# Patient Record
Sex: FEMALE | Race: WHITE | NOT HISPANIC OR LATINO
[De-identification: names, ages, dates, MRNs, and addresses within clinical notes are randomized per-mention and may not be internally consistent; named-entity substitution may affect disease eponyms.]

---

## 2017-05-26 ENCOUNTER — TRANSCRIPTION ENCOUNTER (OUTPATIENT)
Age: 67
End: 2017-05-26

## 2019-01-14 ENCOUNTER — APPOINTMENT (OUTPATIENT)
Dept: SURGERY | Facility: CLINIC | Age: 69
End: 2019-01-14

## 2019-03-27 ENCOUNTER — APPOINTMENT (OUTPATIENT)
Dept: ORTHOPEDIC SURGERY | Facility: CLINIC | Age: 69
End: 2019-03-27
Payer: MEDICARE

## 2019-03-27 VITALS
HEART RATE: 98 BPM | BODY MASS INDEX: 32.14 KG/M2 | DIASTOLIC BLOOD PRESSURE: 85 MMHG | SYSTOLIC BLOOD PRESSURE: 162 MMHG | WEIGHT: 200 LBS | HEIGHT: 66 IN

## 2019-03-27 PROCEDURE — 99203 OFFICE O/P NEW LOW 30 MIN: CPT

## 2019-03-27 RX ORDER — CYCLOBENZAPRINE HYDROCHLORIDE 10 MG/1
10 TABLET, FILM COATED ORAL 3 TIMES DAILY
Qty: 60 | Refills: 0 | Status: ACTIVE | COMMUNITY
Start: 2019-03-27 | End: 1900-01-01

## 2019-03-27 NOTE — DISCUSSION/SUMMARY
[de-identified] : We discussed further treatment options. Her radiculopathy is somewhat atypical. She has multilevel stenosis without concrete concordant symptoms. She appears to have some more facet a genetic symptoms. I recommended a CT scan for further evaluation. She will try a muscle accident. Follow up afterwards

## 2019-03-27 NOTE — PHYSICAL EXAM
[Ataxic] : not ataxic [de-identified] : Examination of the cervical spine reveals no midline or paraspinal tenderness to palpation. No cervical lymphadenopathy. Decreased range of motion with respect to flexion, extension, rotation, and lateral bending. Negative Spurlings. Negative Lhermitte's. Full range of motion bilateral shoulders without evidence of impingement. No instability of bilateral upper extremities.  Cranial nerves II through XII grossly intact. Intact sensation bilateral upper extremities. 5/5 deltoids biceps triceps wrist extensors wrist flexors finger flexors and hand intrinsics. 1+ biceps triceps and brachioradialis reflexes. Negative Ureña's. 2+ radial pulse. Negative Tinel's over the cubital and carpal tunnel. No skin lesions on the right and left upper extremities. [de-identified] : Cervical spine MRI reveals multilevel cervical stenosis more pronounced in the foraminal area

## 2019-03-27 NOTE — HISTORY OF PRESENT ILLNESS
[de-identified] : Ms. DANNIELLE GILMORE  is a 68 year old female who presents with 3 weeks of intense left sided neck pain without any specific cause or trauma.  She has left paracervical/trap pain that can radiate up into the left side of her face and head.  She is unable to sleep because of the pain.  Denies any UE radicular symptoms.  Normal bowel and bladder control.   Denies any recent fevers, chills, sweats, weight loss, or infection.\par

## 2019-04-01 ENCOUNTER — EMERGENCY (EMERGENCY)
Facility: HOSPITAL | Age: 69
LOS: 1 days | Discharge: HOME | End: 2019-04-01
Attending: EMERGENCY MEDICINE | Admitting: EMERGENCY MEDICINE
Payer: MEDICARE

## 2019-04-01 VITALS
HEART RATE: 99 BPM | OXYGEN SATURATION: 95 % | SYSTOLIC BLOOD PRESSURE: 198 MMHG | HEIGHT: 65 IN | RESPIRATION RATE: 18 BRPM | DIASTOLIC BLOOD PRESSURE: 89 MMHG | TEMPERATURE: 98 F | WEIGHT: 226.64 LBS

## 2019-04-01 VITALS
RESPIRATION RATE: 18 BRPM | OXYGEN SATURATION: 95 % | DIASTOLIC BLOOD PRESSURE: 92 MMHG | HEART RATE: 83 BPM | SYSTOLIC BLOOD PRESSURE: 158 MMHG | TEMPERATURE: 97 F

## 2019-04-01 DIAGNOSIS — Z87.898 PERSONAL HISTORY OF OTHER SPECIFIED CONDITIONS: Chronic | ICD-10-CM

## 2019-04-01 DIAGNOSIS — E03.9 HYPOTHYROIDISM, UNSPECIFIED: ICD-10-CM

## 2019-04-01 DIAGNOSIS — Z79.82 LONG TERM (CURRENT) USE OF ASPIRIN: ICD-10-CM

## 2019-04-01 DIAGNOSIS — Z79.899 OTHER LONG TERM (CURRENT) DRUG THERAPY: ICD-10-CM

## 2019-04-01 DIAGNOSIS — Z90.49 ACQUIRED ABSENCE OF OTHER SPECIFIED PARTS OF DIGESTIVE TRACT: Chronic | ICD-10-CM

## 2019-04-01 DIAGNOSIS — Z79.2 LONG TERM (CURRENT) USE OF ANTIBIOTICS: ICD-10-CM

## 2019-04-01 DIAGNOSIS — G89.29 OTHER CHRONIC PAIN: ICD-10-CM

## 2019-04-01 DIAGNOSIS — M54.2 CERVICALGIA: ICD-10-CM

## 2019-04-01 DIAGNOSIS — I25.10 ATHEROSCLEROTIC HEART DISEASE OF NATIVE CORONARY ARTERY WITHOUT ANGINA PECTORIS: ICD-10-CM

## 2019-04-01 DIAGNOSIS — Z98.89 OTHER SPECIFIED POSTPROCEDURAL STATES: Chronic | ICD-10-CM

## 2019-04-01 DIAGNOSIS — E78.5 HYPERLIPIDEMIA, UNSPECIFIED: ICD-10-CM

## 2019-04-01 DIAGNOSIS — I10 ESSENTIAL (PRIMARY) HYPERTENSION: ICD-10-CM

## 2019-04-01 LAB
ALBUMIN SERPL ELPH-MCNC: 4.1 G/DL — SIGNIFICANT CHANGE UP (ref 3.3–5)
ALP SERPL-CCNC: 117 U/L — SIGNIFICANT CHANGE UP (ref 40–120)
ALT FLD-CCNC: 30 U/L — SIGNIFICANT CHANGE UP (ref 10–45)
ANION GAP SERPL CALC-SCNC: 14 MMOL/L — SIGNIFICANT CHANGE UP (ref 5–17)
AST SERPL-CCNC: 38 U/L — SIGNIFICANT CHANGE UP (ref 10–40)
BILIRUB SERPL-MCNC: 0.6 MG/DL — SIGNIFICANT CHANGE UP (ref 0.2–1.2)
BUN SERPL-MCNC: 13 MG/DL — SIGNIFICANT CHANGE UP (ref 7–23)
CALCIUM SERPL-MCNC: 10 MG/DL — SIGNIFICANT CHANGE UP (ref 8.4–10.5)
CHLORIDE SERPL-SCNC: 99 MMOL/L — SIGNIFICANT CHANGE UP (ref 96–108)
CO2 SERPL-SCNC: 24 MMOL/L — SIGNIFICANT CHANGE UP (ref 22–31)
CREAT SERPL-MCNC: 0.84 MG/DL — SIGNIFICANT CHANGE UP (ref 0.5–1.3)
ERYTHROCYTE [SEDIMENTATION RATE] IN BLOOD: 47 MM/HR — HIGH
GLUCOSE SERPL-MCNC: 113 MG/DL — HIGH (ref 70–99)
HCT VFR BLD CALC: 40.1 % — SIGNIFICANT CHANGE UP (ref 34.5–45)
HGB BLD-MCNC: 13.3 G/DL — SIGNIFICANT CHANGE UP (ref 11.5–15.5)
MCHC RBC-ENTMCNC: 29.1 PG — SIGNIFICANT CHANGE UP (ref 27–34)
MCHC RBC-ENTMCNC: 33.2 GM/DL — SIGNIFICANT CHANGE UP (ref 32–36)
MCV RBC AUTO: 87.7 FL — SIGNIFICANT CHANGE UP (ref 80–100)
NRBC # BLD: 0 /100 WBCS — SIGNIFICANT CHANGE UP (ref 0–0)
PLATELET # BLD AUTO: 397 K/UL — SIGNIFICANT CHANGE UP (ref 150–400)
POTASSIUM SERPL-MCNC: 4.2 MMOL/L — SIGNIFICANT CHANGE UP (ref 3.5–5.3)
POTASSIUM SERPL-SCNC: 4.2 MMOL/L — SIGNIFICANT CHANGE UP (ref 3.5–5.3)
PROT SERPL-MCNC: 8.4 G/DL — HIGH (ref 6–8.3)
RBC # BLD: 4.57 M/UL — SIGNIFICANT CHANGE UP (ref 3.8–5.2)
RBC # FLD: 14.6 % — HIGH (ref 10.3–14.5)
SODIUM SERPL-SCNC: 137 MMOL/L — SIGNIFICANT CHANGE UP (ref 135–145)
WBC # BLD: 11.48 K/UL — HIGH (ref 3.8–10.5)
WBC # FLD AUTO: 11.48 K/UL — HIGH (ref 3.8–10.5)

## 2019-04-01 PROCEDURE — 85027 COMPLETE CBC AUTOMATED: CPT

## 2019-04-01 PROCEDURE — 72125 CT NECK SPINE W/O DYE: CPT

## 2019-04-01 PROCEDURE — 99284 EMERGENCY DEPT VISIT MOD MDM: CPT | Mod: 25

## 2019-04-01 PROCEDURE — 93010 ELECTROCARDIOGRAM REPORT: CPT

## 2019-04-01 PROCEDURE — 85652 RBC SED RATE AUTOMATED: CPT

## 2019-04-01 PROCEDURE — 93005 ELECTROCARDIOGRAM TRACING: CPT

## 2019-04-01 PROCEDURE — 70450 CT HEAD/BRAIN W/O DYE: CPT

## 2019-04-01 PROCEDURE — 72125 CT NECK SPINE W/O DYE: CPT | Mod: 26

## 2019-04-01 PROCEDURE — 36415 COLL VENOUS BLD VENIPUNCTURE: CPT

## 2019-04-01 PROCEDURE — 70450 CT HEAD/BRAIN W/O DYE: CPT | Mod: 26

## 2019-04-01 PROCEDURE — 80053 COMPREHEN METABOLIC PANEL: CPT

## 2019-04-01 RX ORDER — OXYCODONE AND ACETAMINOPHEN 5; 325 MG/1; MG/1
2 TABLET ORAL ONCE
Qty: 0 | Refills: 0 | Status: DISCONTINUED | OUTPATIENT
Start: 2019-04-01 | End: 2019-04-01

## 2019-04-01 RX ADMIN — OXYCODONE AND ACETAMINOPHEN 2 TABLET(S): 5; 325 TABLET ORAL at 15:13

## 2019-04-01 RX ADMIN — OXYCODONE AND ACETAMINOPHEN 2 TABLET(S): 5; 325 TABLET ORAL at 20:52

## 2019-04-01 RX ADMIN — OXYCODONE AND ACETAMINOPHEN 2 TABLET(S): 5; 325 TABLET ORAL at 16:13

## 2019-04-01 NOTE — ED PROVIDER NOTE - CLINICAL SUMMARY MEDICAL DECISION MAKING FREE TEXT BOX
68F with L sided neck pain more than 2 weeks followed by pain maangement for epidural injections and takes ibuprofen, presented to ER because she felt that pain was getting worse no change in pain no numbness, no weakness, no fever here. sed rate, wbc count mild elevation; Pt's CT cervical spine shows stenosis, which is known to pain management. Pt to follow up with nsg doubt epidural abscess, 68F with L sided neck pain more than 2 weeks followed by pain maangement for epidural injections and takes ibuprofen, presented to ER because she felt that pain was getting worse no change in pain no numbness, no weakness, no fever here. sed rate, wbc count mild elevation; Pt's CT cervical spine shows stenosis, which is known to pain management. Pt to follow up with nsg doubt epidural abscess, pt has no fever no new neck pain had MRI 1.5 weeks ago that did not show any evidence of abscess or spinal cord compression. Pt has nsg f/u in 4 days. will give short rx for pain medications. 68F with L sided neck pain more than 2 weeks followed by pain maangement for epidural injections and takes ibuprofen, presented to ER because she felt that pain was getting worse no change in pain no numbness, no weakness, no fever here. sed rate, wbc count mild elevation; Pt's CT cervical spine shows stenosis, which is known to pain management. Pt to follow up with nsg doubt epidural abscess, pt has no fever no new neck pain had MRI 1.5 weeks ago that did not show any evidence of abscess or spinal cord compression. Pt has nsg f/u in 4 days. will give short rx for pain medications.  Patient signed AMA did not want to wait for ct of head results. Report of having a appointment with specialist. Well appearing, NAD and vSS.

## 2019-04-01 NOTE — ED PROVIDER NOTE - OBJECTIVE STATEMENT
68F with concern for L sided neck pain affecting shoulder scapula for more than 2 weeks pt has been followed by pain management and has epidural injections, had MRI 1.5 weeks ago, followed by PMD no relief despite ibuprofen. Pt requesting something stronger than ibuprofen, no numbness weakness of arms legs no bowel bladder incontinence. Pt states she feels hot when she uses her heat pack but expressly denies fever.

## 2019-04-01 NOTE — ED ADULT TRIAGE NOTE - CHIEF COMPLAINT QUOTE
c/o subjective fever accompanied with neck pain and upper back pain x 1 month. Referred by PCP to go to the ED. Denies paresthesia to BUE or BLE. Denies bowel/bladder incontinence.

## 2019-04-01 NOTE — ED ADULT NURSE NOTE - OBJECTIVE STATEMENT
Pt presents with c/o neck/back pain x1 month. Pt also reports subjective fevers x 1 week. Pt referred by PCP to ED for scan. Pt denies any CP, SOB, n/v/d, palpitations or dizziness. Denies any fall/injury. Denies bowel/bladder incontinence. Denies numbness/tingling. Pt states she has tried tylenol/motrin without relief.

## 2019-04-01 NOTE — ED PROVIDER NOTE - PHYSICAL EXAMINATION
gen: no acute distress, comfortable, conversant  HEENT: oropharynx clear  Neck: r paraspinal cervical ttp r scapular ttp   CV: rrr no m/r/g 2+ radial pulse  Resp: ctab, no w/c/r  Abd: nontender, no rebound/guarding  Ext: no edema, pedal pulses 2+  Neuro: alert and oriented, cn grossly intact, strength equal in all 4 ext, sensation intact to light touch f/a/l, nl coordination, gait steady    strength 5/5 in bl ue, sensation intact to light touch radial/ulnar/median nerve sensation intact, reflexes biceps 1+ intact  symmetric

## 2019-04-01 NOTE — ED ADULT NURSE NOTE - PMH
CAD (coronary artery disease)    HLD (hyperlipidemia)    HTN (hypertension)    Hypothyroid    Malignant neoplasm of colon, unspecified part of colon

## 2019-04-01 NOTE — ED PROVIDER NOTE - CARE PROVIDERS DIRECT ADDRESSES
[email or web address]

## 2019-04-01 NOTE — ED PROVIDER NOTE - CARE PLAN
Principal Discharge DX:	Neck pain Principal Discharge DX:	Neck pain  Secondary Diagnosis:	Chronic pain

## 2019-04-01 NOTE — ED PROVIDER NOTE - CARE PROVIDER_API CALL
Wyatt Weems)  Neurosurgery  [address] [place] [address]  Phone: [phone]  Fax: [phone]  Follow Up Time:     Cuco Pope)  Orthopedics  [address] [place] [address]  Phone: [phone]  Fax: [phone]  Follow Up Time:

## 2019-04-01 NOTE — ED PROVIDER NOTE - PROGRESS NOTE DETAILS
Discussed with dr tom martinez [phone] who ordered XR, not MRI. pain management ordered MRI Spoke to with Dr. Des Collins who ordered cervical spine MRI. Pt has 3 disc herniation at C2/3, C3/4, C4/5, pt has foraminal stenosis with impingement on nerve root at C4. Stenosis C5/6, C6/7. Arthritis/spondylosis. Pt had cervical epidural injection on March 22nd. Plan to referral for NSG. Spoke to with Dr. Des Collins who ordered cervical spine MRI. Pt has 3 disc herniation at C2/3, C3/4, C4/5, pt has foraminal stenosis with impingement on nerve root at C4. Stenosis C5/6, C6/7. no e/o cord compression or other abscess. Arthritis/spondylosis. Pt had cervical epidural injection on March 22nd. Plan to referral for NSG. appreciate dc coordinator and nsg involvement, pt with sarah girard/sharla on friday appreciate dc coordinator and nsg involvement, pt with sarah ricketts f/sharla on friday pt is ambulatory feels better percocet sent to pharmacy pt endorses that pain is same pain she always has discussed with radiology resident at [facility] [place] on outside mri from 1.5 weeks ago no e/o cervical spine abscess or cord compression, pt awaiting ct head results and can be discharged home

## 2019-04-02 ENCOUNTER — RECORD ABSTRACTING (OUTPATIENT)
Age: 69
End: 2019-04-02

## 2019-04-05 ENCOUNTER — APPOINTMENT (OUTPATIENT)
Dept: NEUROSURGERY | Facility: CLINIC | Age: 69
End: 2019-04-05
Payer: MEDICARE

## 2019-04-05 ENCOUNTER — OUTPATIENT (OUTPATIENT)
Dept: OUTPATIENT SERVICES | Facility: HOSPITAL | Age: 69
LOS: 1 days | End: 2019-04-05
Payer: MEDICARE

## 2019-04-05 VITALS
DIASTOLIC BLOOD PRESSURE: 78 MMHG | SYSTOLIC BLOOD PRESSURE: 141 MMHG | HEIGHT: 66 IN | BODY MASS INDEX: 35.36 KG/M2 | HEART RATE: 81 BPM | TEMPERATURE: 98.8 F | OXYGEN SATURATION: 96 % | WEIGHT: 220 LBS | RESPIRATION RATE: 18 BRPM

## 2019-04-05 DIAGNOSIS — M54.2 CERVICALGIA: ICD-10-CM

## 2019-04-05 DIAGNOSIS — Z90.49 ACQUIRED ABSENCE OF OTHER SPECIFIED PARTS OF DIGESTIVE TRACT: Chronic | ICD-10-CM

## 2019-04-05 DIAGNOSIS — Z87.898 PERSONAL HISTORY OF OTHER SPECIFIED CONDITIONS: Chronic | ICD-10-CM

## 2019-04-05 DIAGNOSIS — M48.02 SPINAL STENOSIS, CERVICAL REGION: ICD-10-CM

## 2019-04-05 DIAGNOSIS — Z98.89 OTHER SPECIFIED POSTPROCEDURAL STATES: Chronic | ICD-10-CM

## 2019-04-05 PROCEDURE — 99205 OFFICE O/P NEW HI 60 MIN: CPT

## 2019-04-05 PROCEDURE — 72050 X-RAY EXAM NECK SPINE 4/5VWS: CPT

## 2019-04-05 PROCEDURE — 72050 X-RAY EXAM NECK SPINE 4/5VWS: CPT | Mod: 26

## 2019-04-06 PROBLEM — M54.2 NECK PAIN: Status: ACTIVE | Noted: 2019-04-05

## 2019-04-06 PROBLEM — M48.02 CERVICAL STENOSIS OF SPINE: Status: ACTIVE | Noted: 2019-03-27

## 2019-04-07 NOTE — HISTORY OF PRESENT ILLNESS
[de-identified] : 68 year old woman with past medical history metastatic colon cancer s/p colon resection, hepatic lobectomy, craniotomy for tumor resection, chemotherapy - now in remission, CAD s/p cardiac stents x 2, hypertension, hypothyroidism referred by ED after she presented to the ED on 4/1/19 with LEFT sided neck pain and numbness that radiates behind her LEFT ear and into hear head.\par \par She reports that she developed severe LEFT sided neck pain and numbness about one month ago. Denies precipitating injury or fall. She denies pain/numbness/tingling radiating into bilateral upper extremities. Denies bilateral leg pain. Denies gait disturbance, urinary/bowel incontinence. She has tried muscle relaxers with no relief. She states she saw pain management doctor and was given three steroid injections without relief of symptoms. \par \par She brings MRI cervical spine from 3/4/19 for review. She had CT cervical spine done in ED on 4/1/19.

## 2019-04-07 NOTE — PLAN
[FreeTextEntry1] : MRI cervical spine and CT cervical spine reviewed at length by Dr. Weems with patient. Imaging demonstrates of cervical spinal stenosis and degenerative disc disease. However, the patient's symptoms of LEFT neck pain radiating to head do not correlate with imaging findings. She denies radicular arm pain bilaterally. \par Explained in detail with patient.\par Cervical flex/ext  x-rays done today by Dr. Ray demonstrate no instability.\par No surgery recommended.\par Recommend physical therapy and consultation with pain management\par Educated regarding s/s neurological worsening including weakness, gait difficulty, incontinence severe pain, return to office or report to ED.\par \par Plan:\par \par 1. Physical therapy\par 2. Pain management - referral and contact information provided to Dr. Nelson Buitrago\par \par Patient verbalizes agreement and understanding with plan.\par

## 2019-04-07 NOTE — DATA REVIEWED
[de-identified] : \par EXAM: CT CERVICAL SPINE \par \par PROCEDURE DATE: 04/01/2019 \par \par \par \par INTERPRETATION: PROCEDURE: CT Cervical spine without contrast \par \par INDICATION: Neck pain \par \par TECHNIQUE: Multiple axial sections were obtained from the mid orbits to the \par sternoclavicular joint followed by multiplanar 3-D reformations. No contrast \par was given. \par \par COMPARISON: None \par \par FINDINGS: \par \par Patient is status post left suboccipital craniectomy with placement of \par radiopaque mesh across craniectomy site. There is fluid seen in the soft \par tissues extrinsic to the craniectomy consistent with pseudomeningocele. This \par measures roughly 4.6 cm TR x 1.9 cm AP dimension. \par \par On the sagittal reformations, there is reversal of the normal curvature of \par the cervical spine with a kyphosis maximum at the C4-C5 level. There is no \par prevertebral soft tissue swelling or splaying of the spinous processes. \par There is mild vertebral body height loss at C5 and C6. Vertebral body \par heights are otherwise maintained. On the coronal reformations, lateral \par masses align normally. C1 articulates normally with C2, and the condyles are \par normal. \par \par On axial images, no lucent fracture line is seen. \par \par Multilevel degenerative osteoarthritis is present. Findings include marginal \par osteophytes, particularly anteriorly, uncovertebral spurring, and facet \par joint space compartment narrowing with hypertrophic osteophytes and \par subchondral sclerosis. There is ankylosis of the facet joints at the C3-C4 \par level on the left. \par This results in multilevel foraminal narrowing as follows: right-sided C3-C4 \par level, right more than left at C5-C6 level, and bilaterally at C6-C7 level. \par \par There is multilevel degenerative disc disease. Findings include loss of disc \par space height and endplate sclerosis. There is diffuse disc-osteophyte \par complex at all levels of the cervical spine, and there may be more focal \par central disc protrusion at C2-C3, C4-C5, and C6-C7 levels. Cervical spinal \par MR could be obtained for further characterization on an outpatient basis. \par \par \par IMPRESSION: \par \par 1. No CT evidence of acute osseous injury. \par 2. Multilevel degenerative disc disease osteoarthritis, as above. \par 3. Kyphosis, apex at the C4-C5 level. \par 4. 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 \par \par \par \par \par \par "Thank you for the opportunity to participate in the care of this patient." \par \par COLLIN MEAD M.D., RADIOLOGY RESIDENT \par This document has been electronically signed. \par VIVIENNE ESPINOZA M.D., ATTENDING RADIOLOGIST \par This document has been electronically signed. Apr 1 2019 6:37PM \par \par \par \par EXAM: CT BRAIN \par \par PROCEDURE DATE: 04/01/2019 \par \par \par \par INTERPRETATION: PROCEDURE: CT head without intravenous contrast. \par \par INDICATION: Neck pain \par \par TECHNIQUE: Multiple axial sections were obtained at 5 mm intervals. The \par images were reviewed in brain and bone windows. Imaging is performed using \par helical low-dose technique, and sagittal and coronal reformations are \par provided. \par \par COMPARISON: CT head from 9/7/2013 \par \par FINDINGS: The patient is status post midline suboccipital craniectomy and \par cranioplasty. The subjacent resection cavity appears to have decreased in \par size to prior study. There is a persistent fluid collection overlying the \par cranioplasty in the subcutaneous tissue which has decreased in size now \par measuring 4.5 cm TR x 1.1 cm AP, previously 7.1 cm TR x 2.8 cm AP. \par \par The ventricles, cisternal spaces, and cortical sulci are appropriate for \par the patient's stated age. \par \par There is no acute intracranial hemorrhage, mass effect, midline shift or \par extra axial collections. \par \par The gray white differentiation appears preserved without evidence of an \par acute transcortical infarction. \par \par There are mild patchy areas of hypodensity within the periventricular white \par matter which may represent the sequela of small vessel ischemic disease. \par \par The bony windows demonstrates no fractures. The visualized paranasal sinuses \par and mastoid air cells are predominantly clear. The lenses are absent \par bilaterally. \par \par IMPRESSION: \par \par 1. No acute intracranial abnormality. Specifically, no acute intracranial \par hemorrhage, mass effect or recent transcortical or territorial infarction. \par \par 2. Status post midline suboccipital craniectomy and cranioplasty. Interval \par decrease in overlying subcutaneous fluid collection which now measures 4.5 \par cm in greatest dimension, previously 7.1 cm. \par \par \par \par \par \par \par \par \par \par \par "Thank you for the opportunity to participate in the care of this patient." \par \par VERNA NUGENT M.D., RADIOLOGY RESIDENT \par This document has been electronically signed. \par LIZA WATTS M.D., ATTENDING RADIOLOGIST \par This document has been electronically signed. Apr 1 2019 10:47PM  [de-identified] : MRI cervical spine 3/4/19: Impression:\par Posterior herniations are noted at C2-C3, C3-C4, C4-C5. At the C3-4 level, there is bony narrowing of the right neural foramen with encroachment upon the right C4 nerve root.\par \par At C5-C6 and C6-7, there is disc degeneration, deisccated disc herniations accompanied by the osteophytic ridging with bilateral foraminal stenosis. There is encroachment upon the nerve roots within the foramina at each of these levels.\par \par There is evidence of prior craniectomy with the presence of pseudomeningocele in the suboccipital region.

## 2019-04-07 NOTE — ADDENDUM
[FreeTextEntry1] : Ms. Valencia has left sided neck pain and associated left shoulder pain. The pain distribution is not clearly radicular. She has full neck ROM and some paraspinal muscle tenderness. Her pain is not related to motion. Cervical flexion-extension x-rays do not show evidence of instability. Her MRI c-spine shows multilevel degenerative spondylosis without significant compression corresponding to symptoms. I have recommended conservative measures including physical therapy and consultation with pain management to address her symptoms.\par \par Wyatt Weems M.D.

## 2019-04-07 NOTE — PHYSICAL EXAM
[General Appearance - Alert] : alert [General Appearance - In No Acute Distress] : in no acute distress [Oriented To Time, Place, And Person] : oriented to person, place, and time [Motor Tone] : muscle tone was normal in all four extremities [Motor Strength] : muscle strength was normal in all four extremities [Sclera] : the sclera and conjunctiva were normal [Outer Ear] : the ears and nose were normal in appearance [Neck Appearance] : the appearance of the neck was normal [] : no respiratory distress [Respiration, Rhythm And Depth] : normal respiratory rhythm and effort [Heart Rate And Rhythm] : heart rate was normal and rhythm regular [Abnormal Walk] : normal gait [Skin Color & Pigmentation] : normal skin color and pigmentation [Pain] : was painless

## 2019-04-19 ENCOUNTER — APPOINTMENT (OUTPATIENT)
Dept: ORTHOPEDIC SURGERY | Facility: CLINIC | Age: 69
End: 2019-04-19

## 2019-05-31 NOTE — HISTORY OF PRESENT ILLNESS
[de-identified] : 68 year old woman with past medical history metastatic colon cancer s/p colon resection, hepatic lobectomy, craniotomy for tumor resection, chemotherapy - now in remission, CAD s/p cardiac stents x 2, hypertension, hypothyroidism referred by ED after she presented to the ED on 4/1/19 with LEFT sided neck pain and numbness that radiates behind her LEFT ear and into hear head.\par \par She reports that she developed severe LEFT sided neck pain and numbness about one month ago. Denies precipitating injury or fall. She denies pain/numbness/tingling radiating into bilateral upper extremities. Denies bilateral leg pain. Denies gait disturbance, urinary/bowel incontinence. She has tried muscle relaxers with no relief. She states she saw pain management doctor and was given three steroid injections without relief of symptoms. \par \par She brings MRI cervical spine from 3/4/19 for review. She had CT cervical spine done in ED on 4/1/19.

## 2019-06-03 ENCOUNTER — APPOINTMENT (OUTPATIENT)
Dept: NEUROSURGERY | Facility: CLINIC | Age: 69
End: 2019-06-03

## 2019-06-22 ENCOUNTER — TRANSCRIPTION ENCOUNTER (OUTPATIENT)
Age: 69
End: 2019-06-22

## 2019-09-23 ENCOUNTER — EMERGENCY (EMERGENCY)
Facility: HOSPITAL | Age: 69
LOS: 1 days | Discharge: HOME | End: 2019-09-23
Attending: EMERGENCY MEDICINE | Admitting: EMERGENCY MEDICINE
Payer: MEDICARE

## 2019-09-23 VITALS
HEIGHT: 65 IN | OXYGEN SATURATION: 98 % | SYSTOLIC BLOOD PRESSURE: 159 MMHG | DIASTOLIC BLOOD PRESSURE: 92 MMHG | RESPIRATION RATE: 16 BRPM | TEMPERATURE: 99 F | HEART RATE: 118 BPM | WEIGHT: 179.9 LBS

## 2019-09-23 VITALS
DIASTOLIC BLOOD PRESSURE: 76 MMHG | OXYGEN SATURATION: 98 % | HEART RATE: 82 BPM | SYSTOLIC BLOOD PRESSURE: 151 MMHG | RESPIRATION RATE: 16 BRPM | TEMPERATURE: 98 F

## 2019-09-23 DIAGNOSIS — Z98.89 OTHER SPECIFIED POSTPROCEDURAL STATES: Chronic | ICD-10-CM

## 2019-09-23 DIAGNOSIS — Z87.898 PERSONAL HISTORY OF OTHER SPECIFIED CONDITIONS: Chronic | ICD-10-CM

## 2019-09-23 DIAGNOSIS — Z90.49 ACQUIRED ABSENCE OF OTHER SPECIFIED PARTS OF DIGESTIVE TRACT: Chronic | ICD-10-CM

## 2019-09-23 LAB
APPEARANCE UR: ABNORMAL
APTT BLD: 35.8 SEC — SIGNIFICANT CHANGE UP (ref 27.5–36.3)
BACTERIA # UR AUTO: PRESENT /HPF
BASOPHILS # BLD AUTO: 0.04 K/UL — SIGNIFICANT CHANGE UP (ref 0–0.2)
BASOPHILS NFR BLD AUTO: 0.9 % — SIGNIFICANT CHANGE UP (ref 0–2)
BILIRUB UR-MCNC: ABNORMAL
BLD GP AB SCN SERPL QL: NEGATIVE — SIGNIFICANT CHANGE UP
COLOR SPEC: YELLOW — SIGNIFICANT CHANGE UP
COMMENT - URINE: SIGNIFICANT CHANGE UP
DACRYOCYTES BLD QL SMEAR: SLIGHT — SIGNIFICANT CHANGE UP
DIFF PNL FLD: ABNORMAL
ELLIPTOCYTES BLD QL SMEAR: SLIGHT — SIGNIFICANT CHANGE UP
EOSINOPHIL # BLD AUTO: 0.12 K/UL — SIGNIFICANT CHANGE UP (ref 0–0.5)
EOSINOPHIL NFR BLD AUTO: 2.6 % — SIGNIFICANT CHANGE UP (ref 0–6)
EPI CELLS # UR: ABNORMAL /HPF (ref 0–5)
GIANT PLATELETS BLD QL SMEAR: PRESENT — SIGNIFICANT CHANGE UP
GLUCOSE UR QL: NEGATIVE — SIGNIFICANT CHANGE UP
GRAN CASTS # UR COMP ASSIST: ABNORMAL /LPF
HCT VFR BLD CALC: 31.6 % — LOW (ref 34.5–45)
HGB BLD-MCNC: 10.2 G/DL — LOW (ref 11.5–15.5)
HYALINE CASTS # UR AUTO: SIGNIFICANT CHANGE UP /LPF (ref 0–2)
HYPOCHROMIA BLD QL: SLIGHT — SIGNIFICANT CHANGE UP
INR BLD: 1.19 — HIGH (ref 0.88–1.16)
KETONES UR-MCNC: 15 MG/DL
LEUKOCYTE ESTERASE UR-ACNC: ABNORMAL
LIDOCAIN IGE QN: 20 U/L — SIGNIFICANT CHANGE UP (ref 7–60)
LYMPHOCYTES # BLD AUTO: 0.45 K/UL — LOW (ref 1–3.3)
LYMPHOCYTES # BLD AUTO: 9.7 % — LOW (ref 13–44)
MACROCYTES BLD QL: SLIGHT — SIGNIFICANT CHANGE UP
MANUAL SMEAR VERIFICATION: SIGNIFICANT CHANGE UP
MCHC RBC-ENTMCNC: 31.5 PG — SIGNIFICANT CHANGE UP (ref 27–34)
MCHC RBC-ENTMCNC: 32.3 GM/DL — SIGNIFICANT CHANGE UP (ref 32–36)
MCV RBC AUTO: 97.5 FL — SIGNIFICANT CHANGE UP (ref 80–100)
MONOCYTES # BLD AUTO: 0.37 K/UL — SIGNIFICANT CHANGE UP (ref 0–0.9)
MONOCYTES NFR BLD AUTO: 8 % — SIGNIFICANT CHANGE UP (ref 2–14)
NEUTROPHILS # BLD AUTO: 3.66 K/UL — SIGNIFICANT CHANGE UP (ref 1.8–7.4)
NEUTROPHILS NFR BLD AUTO: 72.6 % — SIGNIFICANT CHANGE UP (ref 43–77)
NEUTS BAND # BLD: 6.2 % — SIGNIFICANT CHANGE UP (ref 0–8)
NITRITE UR-MCNC: NEGATIVE — SIGNIFICANT CHANGE UP
OVALOCYTES BLD QL SMEAR: SLIGHT — SIGNIFICANT CHANGE UP
PH UR: 6.5 — SIGNIFICANT CHANGE UP (ref 5–8)
PLAT MORPH BLD: ABNORMAL
PLATELET # BLD AUTO: 286 K/UL — SIGNIFICANT CHANGE UP (ref 150–400)
PROT UR-MCNC: 100 MG/DL
PROTHROM AB SERPL-ACNC: 13.5 SEC — HIGH (ref 10–12.9)
RBC # BLD: 3.24 M/UL — LOW (ref 3.8–5.2)
RBC # FLD: 16.8 % — HIGH (ref 10.3–14.5)
RBC BLD AUTO: ABNORMAL
RBC CASTS # UR COMP ASSIST: < 5 /HPF — SIGNIFICANT CHANGE UP
RH IG SCN BLD-IMP: POSITIVE — SIGNIFICANT CHANGE UP
SP GR SPEC: 1.02 — SIGNIFICANT CHANGE UP (ref 1–1.03)
STOMATOCYTES BLD QL SMEAR: SLIGHT — SIGNIFICANT CHANGE UP
UROBILINOGEN FLD QL: 1 E.U./DL — SIGNIFICANT CHANGE UP
WBC # BLD: 4.65 K/UL — SIGNIFICANT CHANGE UP (ref 3.8–10.5)
WBC # FLD AUTO: 4.65 K/UL — SIGNIFICANT CHANGE UP (ref 3.8–10.5)
WBC UR QL: ABNORMAL /HPF

## 2019-09-23 PROCEDURE — 83690 ASSAY OF LIPASE: CPT

## 2019-09-23 PROCEDURE — 85610 PROTHROMBIN TIME: CPT

## 2019-09-23 PROCEDURE — 74177 CT ABD & PELVIS W/CONTRAST: CPT

## 2019-09-23 PROCEDURE — 99284 EMERGENCY DEPT VISIT MOD MDM: CPT | Mod: 25

## 2019-09-23 PROCEDURE — 85025 COMPLETE CBC W/AUTO DIFF WBC: CPT

## 2019-09-23 PROCEDURE — 74177 CT ABD & PELVIS W/CONTRAST: CPT | Mod: 26

## 2019-09-23 PROCEDURE — 86901 BLOOD TYPING SEROLOGIC RH(D): CPT

## 2019-09-23 PROCEDURE — 71260 CT THORAX DX C+: CPT | Mod: 26

## 2019-09-23 PROCEDURE — 80053 COMPREHEN METABOLIC PANEL: CPT

## 2019-09-23 PROCEDURE — 71260 CT THORAX DX C+: CPT

## 2019-09-23 PROCEDURE — 85730 THROMBOPLASTIN TIME PARTIAL: CPT

## 2019-09-23 PROCEDURE — 81001 URINALYSIS AUTO W/SCOPE: CPT

## 2019-09-23 PROCEDURE — 99284 EMERGENCY DEPT VISIT MOD MDM: CPT

## 2019-09-23 PROCEDURE — 86900 BLOOD TYPING SEROLOGIC ABO: CPT

## 2019-09-23 PROCEDURE — 36415 COLL VENOUS BLD VENIPUNCTURE: CPT

## 2019-09-23 PROCEDURE — 86850 RBC ANTIBODY SCREEN: CPT

## 2019-09-23 RX ORDER — IOHEXOL 300 MG/ML
30 INJECTION, SOLUTION INTRAVENOUS ONCE
Refills: 0 | Status: COMPLETED | OUTPATIENT
Start: 2019-09-23 | End: 2019-09-23

## 2019-09-23 RX ORDER — SODIUM CHLORIDE 9 MG/ML
500 INJECTION INTRAMUSCULAR; INTRAVENOUS; SUBCUTANEOUS ONCE
Refills: 0 | Status: COMPLETED | OUTPATIENT
Start: 2019-09-23 | End: 2019-09-23

## 2019-09-23 RX ADMIN — IOHEXOL 30 MILLILITER(S): 300 INJECTION, SOLUTION INTRAVENOUS at 17:03

## 2019-09-23 RX ADMIN — SODIUM CHLORIDE 500 MILLILITER(S): 9 INJECTION INTRAMUSCULAR; INTRAVENOUS; SUBCUTANEOUS at 18:01

## 2019-09-23 NOTE — ED PROVIDER NOTE - PROGRESS NOTE DETAILS
Pt requesting discharge, when asked why she states "because I never saw my surgeon!" Again explained that the surgeon she was sent to the ED would not likely be in the ER today, as confirmed by surgery resident previously. Advised we did not yet have the results of her scans available at this time, and that I could not predict what would be recommended yet by surgery team. Pt States she came here for a surgery the same day and is very upset that did not happen. Reminded again that from our conversation when I first interviewed her that this was unlikely to occur today and that surgery would need to make a decision. Pt refused to have meaningful conversation regarding her risks/benefits, states "he's not the only surgeon in town, I can go elsewhere. I demand respect." and walked out.

## 2019-09-23 NOTE — ED ADULT NURSE REASSESSMENT NOTE - NS ED NURSE REASSESS COMMENT FT1
Rec'd pt agitated, reporting that she is not pleased with her wait time. Reassurance offered and pt made aware of plan of care. Pt in stable condition, breathing with ease on  room air. Pt states "I am not feeling well but I am fine". Pt did not wish to disclose any more information. Safety precautions in place. Monitoring contiunes.

## 2019-09-23 NOTE — CONSULT NOTE ADULT - ASSESSMENT
62 yo F w/ hx HTN, HLD, hypothyroidism, brachiocephalic thrombosis (2012, tx w/ lovenox), CAD s/p PCI (2012) and stent (2016), colon cancer s/p chemoradiation, LAR (2012) and hepatic segment 8 segmentectomy/cholecystectomy (2012), benign meningioma s/p craniotomy and resection (2013), now on maintenance chemo for metastatic disease (last dose 2 weeks ago), with metastatic colon cancer. Patient has stable metastatic disease to liver and lungs with worsening pelvic disease,     - recommended admission to Dr. John with plan for surgery this week on worsening pelvic disease, however patient eloped from ED before could admit as was upset she wasn't having surgery immediately  - if returns to ED tonight/tomorrow recommend clear liquid diet, consult Dr. Parker in AM for pulmonary eval and pre-operative workup, consult gyn/onc (Dr. John to name attending)

## 2019-09-23 NOTE — ED ADULT NURSE NOTE - NSFALLRSKPASTHIST_ED_ALL_ED
Received call from Camille Barnett [phone]      Mesha Collazo    [facility]     no

## 2019-09-23 NOTE — CONSULT NOTE ADULT - SUBJECTIVE AND OBJECTIVE BOX
64 yo F w/ hx HTN, HLD, hypothyroidism, brachiocephalic thrombosis (2012, tx w/ lovenox), CAD s/p PCI (2012) and stent (2016), colon cancer s/p chemoradiation, LAR (2012) and hepatic segment 8 segmentectomy/cholecystectomy (2012), benign meningioma s/p craniotomy and resection (2013), now on maintenance chemo for metastatic disease (last dose 2 weeks ago), presenting with chronic lower abdominal pain. Endorses nausea but denies emesis. Last BM yest, non-bloody. Denies hematuria, dysuria, CP, SOB.     Meds: levothyroxine 175 qD, amlodipine 5 mg qD, folic acid 1 mg qD    Vital Signs Last 24 Hrs  T(C): 36.8 (23 Sep 2019 19:09), Max: 37 (23 Sep 2019 14:54)  T(F): 98.2 (23 Sep 2019 19:09), Max: 98.6 (23 Sep 2019 14:54)  HR: 82 (23 Sep 2019 19:09) (82 - 118)  BP: 151/76 (23 Sep 2019 19:09) (151/76 - 159/92)  BP(mean): --  RR: 16 (23 Sep 2019 19:09) (16 - 16)  SpO2: 98% (23 Sep 2019 19:09) (98% - 98%)    General: NAD  Pulm: normal resp effort and excursion  Abd: soft, minimal distension, lower abdominal TTP, no guarding or rebound                          10.2   4.65  )-----------( 286      ( 23 Sep 2019 16:29 )             31.6   09-23    141  |  103  |  15  ----------------------------<  127<H>  3.4<L>   |  23  |  0.85    Ca    10.0      23 Sep 2019 16:29    TPro  7.9  /  Alb  4.0  /  TBili  1.0  /  DBili  x   /  AST  22  /  ALT  11  /  AlkPhos  113  09-23    CT  IMPRESSION:   Questionable filling defect within left common femoral vein suspicious   for thrombosis. Recommend correlation with lower extremity Doppler.    Numerous pulmonary lesions throughout the right and left lung fields   consistent with pulmonary metastatic disease.    New low attenuating lesion within the medial left hepatic lobe measuring   up to 6.9 cm. This demonstrates a density of 36 Hounsfield units. In a   patient with colonic neoplasm cannot exclude hepatic metastatic disease.   Recommend further correlation with biopsy or PET/CT.    Severe right renal hydronephrosis likely secondary to extrinsic   compression on the distal right ureter from masslike lesion within the   pelvis within the region of the right aspect of the uterus/adnexa. This   is new from the prior study of 3/28/2015.      New 10 mm circumscribed sclerotic lesion involving the L1 vertebral body   which is new from the prior study along with sclerotic changes involving   the right ischium. Findings are concerning for osseous metastatic   disease. Consider further correlation with nuclear medicine bone scan.

## 2019-09-23 NOTE — ED PROVIDER NOTE - OBJECTIVE STATEMENT
69F pmh CAD, HLD, HTN, hypothyroid, colon cancer p/w abd pain.    PMD Dr. Martin 69F pmh CAD, HLD, HTN, hypothyroid, colon cancer p/w abd pain, suprapubic, min change from prior. No n/v, no vomiting, no diarrhea, no f/c, no cp, no sob. Pain is moderate after PO intake.     PMD Dr. Martin

## 2019-09-23 NOTE — ED ADULT NURSE NOTE - CHPI ED NUR SYMPTOMS NEG
no diarrhea/no vomiting/no hematuria/no dysuria/no fever/no blood in stool/no burning urination/no chills

## 2019-09-23 NOTE — ED PROVIDER NOTE - CLINICAL SUMMARY MEDICAL DECISION MAKING FREE TEXT BOX
69F pmh CAD, HLD, HTN, hypothyroid, colon cancer p/w abd pain, suprapubic, min change from prior. No n/v, no vomiting, no diarrhea, no f/c, no cp, no sob. Pain is moderate after PO intake. Exam noted for tachycardia, suprapubic distention. Concern cancer, mass, dehydration, electrolyte abnormality, consider obstructive process. Gen surg consulted, final recs pending. 69F pmh CAD, HLD, HTN, hypothyroid, colon cancer p/w abd pain, suprapubic, min change from prior. No n/v, no vomiting, no diarrhea, no f/c, no cp, no sob. Pain is moderate after PO intake. Exam noted for tachycardia, suprapubic distention. Concern cancer, mass, dehydration, electrolyte abnormality, consider obstructive process. Gen surg consulted, final recs pending. CT obtained, however pt upset she did not get a same-day surgery for her cancer. Pt eloped without meaningful discussion, refused to consider AMA discussion.

## 2019-09-23 NOTE — ED PROVIDER NOTE - PHYSICAL EXAMINATION
VITAL SIGNS: I have reviewed nursing notes and confirm.  CONSTITUTIONAL: Well-developed; well-nourished; in min distress.  SKIN: Skin is warm and dry, no acute rash.  HEAD: Normocephalic; atraumatic.  EYES:  EOM intact; conjunctiva and sclera clear.  ENT: No nasal discharge; airway clear.  NECK: Supple; Voluntary FROM  CARD: No rubs appreciated, tachycardic rate and rhythm.  RESP: No wheezes, no rales. No respiratory distress  ABD: Soft; non-distended; non-tender; no rebound or guarding, mild suprapubic distention  EXT: Normal ROM. No cyanosis or edema.  NEURO: Alert, oriented. Grossly unremarkable.  PSYCH: Cooperative, appropriate.

## 2019-09-23 NOTE — ED ADULT TRIAGE NOTE - CHIEF COMPLAINT QUOTE
"my doctor Dr. Martin called me and told me to come because he found a surgeon who takes my insurance to do surgery for my stage 4 CA" pt does not know what kind of surgery, endorses abd pain for weeks; EKG in progress

## 2019-09-23 NOTE — ED ADULT NURSE REASSESSMENT NOTE - NS ED NURSE REASSESS COMMENT FT1
Pt reports frustration and stated that she was leaving because she has been here a while and does not wish to wait any longer. Pt made aware of the plan of care and RN attempted to assist and alleviate patient's concerns. Pt is not re-directable. Pt has family member with her who states that she is well enough to leave and follow up out patient. MD Breed made aware and will assess pt. Pt was made aware that the MD will assess her. Pt agreed to wait, only momentarily for MD "otherwise I am out of here.". Pt changed back into her personal clothes, sitting in chair. Will follow up. HL was removed as per pt's request.

## 2019-10-02 DIAGNOSIS — R14.0 ABDOMINAL DISTENSION (GASEOUS): ICD-10-CM

## 2019-10-02 DIAGNOSIS — R10.30 LOWER ABDOMINAL PAIN, UNSPECIFIED: ICD-10-CM

## 2020-03-19 ENCOUNTER — OTHER- (OUTPATIENT)
Dept: RURAL CLINIC 1 | Facility: CLINIC | Age: 70
Setting detail: DERMATOLOGY
End: 2020-03-19

## 2020-03-19 DIAGNOSIS — C44.319 BASAL CELL CARCINOMA OF SKIN OF OTHER PARTS OF FACE: ICD-10-CM

## 2020-03-19 PROCEDURE — 99202 OFFICE O/P NEW SF 15 MIN: CPT

## 2020-03-19 RX ORDER — TRIAMCINOLONE ACETONIDE 1 MG/G
1 APPLICATION OINTMENT TOPICAL BID
Qty: 15 | Refills: 3
Start: 2020-03-19

## 2020-09-03 NOTE — ED PROVIDER NOTE - PMH
0 = swallows foods/liquids without difficulty
CAD (coronary artery disease)    HLD (hyperlipidemia)    HTN (hypertension)    Hypothyroid    Malignant neoplasm of colon, unspecified part of colon

## 2020-10-06 NOTE — ED ADULT NURSE NOTE - CARDIO ASSESSMENT
Spoke with patient and told her that we will call her as soon as we get her lab work back.      ----- Message from Jeniffer Wylie sent at 10/6/2020  3:49 PM CDT -----  Regarding: Patient Call Back  Who Called:KEVAN LOVETT [[id number]]    What is the request in detail:Call back in regards to lab orders and results     Can the clinic reply by  [facility]?No    Best Call Back Number:[phone]         ---

## 2021-02-10 NOTE — ED ADULT NURSE NOTE - NS ED NURSE LEVEL OF CONSCIOUSNESS SPEECH
ESTABLISHED PATIENT PRE-VISIT PLANNING     Patient was NOT contacted to complete PVP.     Note: Patient will not be contacted if there is no indication to call.     1.  Reviewed notes from the last few office visits within the medical group: Yes    2.  If any orders were placed at last visit or intended to be done for this visit (i.e. 6 mos follow-up), do we have Results/Consult Notes?         •  Labs - Labs ordered, completed on 02/03/2021 and results are in chart.  Note: If patient appointment is for lab review and patient did not complete labs, check with provider if OK to reschedule patient until labs completed.       •  Imaging - Imaging ordered, completed and results are in chart.       •  Referrals - Referral ordered, patient has NOT been seen.    3. Is this appointment scheduled as a Hospital Follow-Up? No    4.  Immunizations were updated in Epic using Reconcile Outside Information activity? Yes    5.  Patient is due for the following Health Maintenance Topics:   Health Maintenance Due   Topic Date Due   • IMM ZOSTER VACCINES (1 of 2) 03/14/2007   • IMM INFLUENZA (1) 09/01/2020       6.  AHA (Pulse8) form printed for Provider? N/A     Speaking Coherently

## 2022-11-09 RX ORDER — HYDROMORPHONE HYDROCHLORIDE 2 MG/ML
0.5 INJECTION, SOLUTION INTRAMUSCULAR; INTRAVENOUS; SUBCUTANEOUS EVERY 5 MIN PRN
Status: CANCELLED | OUTPATIENT
Start: 2022-11-09

## 2022-11-09 RX ORDER — SODIUM CHLORIDE 0.9 % (FLUSH) 0.9 %
5-40 SYRINGE (ML) INJECTION PRN
Status: CANCELLED | OUTPATIENT
Start: 2022-11-09

## 2022-11-09 RX ORDER — LABETALOL HYDROCHLORIDE 5 MG/ML
10 INJECTION, SOLUTION INTRAVENOUS
Status: CANCELLED | OUTPATIENT
Start: 2022-11-09

## 2022-11-09 RX ORDER — PROCHLORPERAZINE EDISYLATE 5 MG/ML
5 INJECTION INTRAMUSCULAR; INTRAVENOUS
Status: CANCELLED | OUTPATIENT
Start: 2022-11-09 | End: 2022-11-10

## 2022-11-09 RX ORDER — HYDRALAZINE HYDROCHLORIDE 20 MG/ML
10 INJECTION INTRAMUSCULAR; INTRAVENOUS
Status: CANCELLED | OUTPATIENT
Start: 2022-11-09

## 2022-11-09 RX ORDER — HYDROMORPHONE HYDROCHLORIDE 2 MG/ML
0.25 INJECTION, SOLUTION INTRAMUSCULAR; INTRAVENOUS; SUBCUTANEOUS EVERY 5 MIN PRN
Status: CANCELLED | OUTPATIENT
Start: 2022-11-09

## 2022-11-09 RX ORDER — DIPHENHYDRAMINE HYDROCHLORIDE 50 MG/ML
12.5 INJECTION INTRAMUSCULAR; INTRAVENOUS
Status: CANCELLED | OUTPATIENT
Start: 2022-11-09 | End: 2022-11-10

## 2022-11-09 RX ORDER — HALOPERIDOL 5 MG/ML
1 INJECTION INTRAMUSCULAR
Status: CANCELLED | OUTPATIENT
Start: 2022-11-09 | End: 2022-11-10

## 2022-11-09 RX ORDER — SODIUM CHLORIDE 9 MG/ML
INJECTION, SOLUTION INTRAVENOUS PRN
Status: CANCELLED | OUTPATIENT
Start: 2022-11-09

## 2022-11-09 RX ORDER — OXYCODONE HYDROCHLORIDE 5 MG/1
5 TABLET ORAL
Status: CANCELLED | OUTPATIENT
Start: 2022-11-09 | End: 2022-11-10

## 2022-11-09 RX ORDER — SODIUM CHLORIDE 0.9 % (FLUSH) 0.9 %
5-40 SYRINGE (ML) INJECTION EVERY 12 HOURS SCHEDULED
Status: CANCELLED | OUTPATIENT
Start: 2022-11-09

## 2022-11-10 ENCOUNTER — APPOINTMENT (OUTPATIENT)
Dept: GENERAL RADIOLOGY | Age: 72
End: 2022-11-10
Attending: PODIATRIST
Payer: OTHER GOVERNMENT

## 2022-11-10 ENCOUNTER — HOSPITAL ENCOUNTER (OUTPATIENT)
Age: 72
Discharge: HOME | End: 2022-11-10
Attending: PODIATRIST | Admitting: PODIATRIST
Payer: OTHER GOVERNMENT

## 2022-11-10 PROBLEM — M20.11 HALLUX VALGUS (ACQUIRED), RIGHT FOOT: Status: ACTIVE | Noted: 2022-11-10

## 2022-11-10 LAB
GLUCOSE BLD STRIP.AUTO-MCNC: 90 MG/DL (ref 65–100)
POTASSIUM BLD-SCNC: 4.5 MMOL/L (ref 3.5–5.1)
SERVICE CMNT-IMP: NORMAL

## 2022-11-10 PROCEDURE — 84132 ASSAY OF SERUM POTASSIUM: CPT

## 2022-11-10 PROCEDURE — 82962 GLUCOSE BLOOD TEST: CPT

## 2022-11-10 RX ORDER — SODIUM CHLORIDE 0.9 % (FLUSH) 0.9 %
5-40 SYRINGE (ML) INJECTION PRN
Status: DISCONTINUED | OUTPATIENT
Start: 2022-11-10 | End: 2022-11-10 | Stop reason: HOSPADM

## 2022-11-10 RX ORDER — SODIUM CHLORIDE 9 MG/ML
INJECTION, SOLUTION INTRAVENOUS PRN
Status: DISCONTINUED | OUTPATIENT
Start: 2022-11-10 | End: 2022-11-10 | Stop reason: SDUPTHER

## 2022-11-10 RX ORDER — SODIUM CHLORIDE 9 MG/ML
INJECTION, SOLUTION INTRAVENOUS PRN
Status: DISCONTINUED | OUTPATIENT
Start: 2022-11-10 | End: 2022-11-10 | Stop reason: HOSPADM

## 2022-11-10 RX ORDER — SODIUM CHLORIDE 0.9 % (FLUSH) 0.9 %
5-40 SYRINGE (ML) INJECTION EVERY 12 HOURS SCHEDULED
Status: DISCONTINUED | OUTPATIENT
Start: 2022-11-10 | End: 2022-11-10 | Stop reason: HOSPADM

## 2022-11-10 RX ORDER — SODIUM CHLORIDE, SODIUM LACTATE, POTASSIUM CHLORIDE, CALCIUM CHLORIDE 600; 310; 30; 20 MG/100ML; MG/100ML; MG/100ML; MG/100ML
INJECTION, SOLUTION INTRAVENOUS CONTINUOUS
Status: DISCONTINUED | OUTPATIENT
Start: 2022-11-10 | End: 2022-11-10 | Stop reason: HOSPADM

## 2022-11-10 RX ORDER — LIDOCAINE HYDROCHLORIDE 10 MG/ML
1 INJECTION, SOLUTION INFILTRATION; PERINEURAL
Status: DISCONTINUED | OUTPATIENT
Start: 2022-11-10 | End: 2022-11-10 | Stop reason: HOSPADM

## 2022-11-10 NOTE — PERIOP NOTE
On arrival patients BP was 240/110. Manual BP checked was 260/100, patient denies any chest pain or shortness of breath. Patient is alert with spouse at bedside. MDA notified. MDA spoke with patient and advised patient that surgery needs to be rescheduled. Advised patient to seek immediate medical attention is she starts to experience any signs or symptoms of a heart attack or stroke. Patient also advised to inform PCP of abnormal BP.

## 2023-05-24 RX ORDER — METOPROLOL SUCCINATE 25 MG/1
1 TABLET, EXTENDED RELEASE ORAL
COMMUNITY
Start: 2022-11-02

## 2023-05-24 RX ORDER — ALBUTEROL SULFATE 90 UG/1
2 AEROSOL, METERED RESPIRATORY (INHALATION) EVERY 4 HOURS PRN
COMMUNITY
Start: 2013-01-21

## 2023-05-24 RX ORDER — FLUTICASONE PROPIONATE 50 MCG
2 SPRAY, SUSPENSION (ML) NASAL DAILY
COMMUNITY
Start: 2021-07-26

## 2023-05-24 RX ORDER — OXYBUTYNIN CHLORIDE 5 MG/1
5 TABLET ORAL PRN
COMMUNITY
Start: 2020-12-22

## 2023-05-24 RX ORDER — AMLODIPINE BESYLATE 10 MG/1
10 TABLET ORAL DAILY
COMMUNITY
Start: 2021-12-21

## 2023-05-24 RX ORDER — MELOXICAM 15 MG/1
1 TABLET ORAL
COMMUNITY
Start: 2021-09-23

## 2023-05-24 RX ORDER — GABAPENTIN 400 MG/1
400 CAPSULE ORAL 3 TIMES DAILY
COMMUNITY
Start: 2014-12-26

## 2023-05-24 RX ORDER — ACETAMINOPHEN 500 MG
500 TABLET ORAL PRN
COMMUNITY
Start: 2013-01-21

## 2023-05-24 RX ORDER — ASPIRIN 81 MG/81MG
1 CAPSULE ORAL
COMMUNITY

## 2023-05-24 RX ORDER — ATORVASTATIN CALCIUM 80 MG/1
80 TABLET, FILM COATED ORAL DAILY
COMMUNITY
Start: 2013-04-30

## 2023-05-24 RX ORDER — HYDROCHLOROTHIAZIDE 25 MG/1
25 TABLET ORAL DAILY
COMMUNITY
Start: 2014-12-26

## 2023-05-24 RX ORDER — FERROUS SULFATE 324(65)MG
324 TABLET, DELAYED RELEASE (ENTERIC COATED) ORAL
COMMUNITY
Start: 2022-03-18

## 2023-05-24 RX ORDER — LEVOCETIRIZINE DIHYDROCHLORIDE 5 MG/1
TABLET, FILM COATED ORAL
COMMUNITY
Start: 2021-07-26

## 2023-05-24 RX ORDER — FLUTICASONE FUROATE AND VILANTEROL 100; 25 UG/1; UG/1
1 POWDER RESPIRATORY (INHALATION)
COMMUNITY
Start: 2021-11-15

## 2023-05-24 RX ORDER — LISINOPRIL 20 MG/1
20 TABLET ORAL DAILY
COMMUNITY

## 2023-06-08 ENCOUNTER — ANESTHESIA EVENT (OUTPATIENT)
Dept: SURGERY | Age: 73
End: 2023-06-08
Payer: OTHER GOVERNMENT

## 2023-06-08 NOTE — PROGRESS NOTES
Preop department called to notify patient of arrival time for scheduled procedure. Instructions given to   - Arrive at [address] - Remain NPO after midnight, unless otherwise indicated, including gum, mints, and ice chips. - Have a responsible adult to drive patient to the hospital, stay during surgery, and patient will need supervision 24 hours after anesthesia. - Use antibacterial soap in shower the night before surgery and on the morning of surgery.        Was patient contacted: voicemail  Voicemail left: yes  Numbers contacted: [phone]   Arrival time: 0700

## 2023-06-08 NOTE — PERIOP NOTE
Preop department called to notify patient of arrival time for scheduled procedure. Instructions given to   - Arrive at [address] - Remain NPO after midnight, unless otherwise indicated, including gum, mints, and ice chips. - Have a responsible adult to drive patient to the hospital, stay during surgery, and patient will need supervision 24 hours after anesthesia. - Use antibacterial soap in shower the night before surgery and on the morning of surgery.        Was patient contacted: pt  Voicemail left:   Numbers contacted: [phone]   Arrival time: 0700

## 2023-06-09 ENCOUNTER — ANESTHESIA (OUTPATIENT)
Dept: SURGERY | Age: 73
End: 2023-06-09
Payer: OTHER GOVERNMENT

## 2023-06-09 ENCOUNTER — APPOINTMENT (OUTPATIENT)
Dept: GENERAL RADIOLOGY | Age: 73
End: 2023-06-09
Attending: PODIATRIST
Payer: OTHER GOVERNMENT

## 2023-06-09 ENCOUNTER — HOSPITAL ENCOUNTER (OUTPATIENT)
Age: 73
Discharge: HOME | End: 2023-06-09
Attending: PODIATRIST | Admitting: PODIATRIST
Payer: OTHER GOVERNMENT

## 2023-06-09 VITALS
DIASTOLIC BLOOD PRESSURE: 78 MMHG | BODY MASS INDEX: 26.7 KG/M2 | OXYGEN SATURATION: 95 % | RESPIRATION RATE: 16 BRPM | SYSTOLIC BLOOD PRESSURE: 171 MMHG | HEIGHT: 60 IN | TEMPERATURE: 97.6 F | WEIGHT: 136 LBS | HEART RATE: 64 BPM

## 2023-06-09 PROBLEM — M20.11 HALLUX ABDUCTOVALGUS WITH BUNIONS, RIGHT: Status: ACTIVE | Noted: 2023-06-09

## 2023-06-09 PROBLEM — M21.611 HALLUX ABDUCTOVALGUS WITH BUNIONS, RIGHT: Status: ACTIVE | Noted: 2023-06-09

## 2023-06-09 LAB
GLUCOSE BLD STRIP.AUTO-MCNC: 87 MG/DL (ref 65–100)
HGB BLD-MCNC: 12.4 G/DL (ref 11.7–15.4)
POTASSIUM BLD-SCNC: 3.9 MMOL/L (ref 3.5–5.1)
POTASSIUM BLD-SCNC: 5.3 MMOL/L (ref 3.5–5.1)
SERVICE CMNT-IMP: NORMAL

## 2023-06-09 PROCEDURE — 6360000002 HC RX W HCPCS: Performed by: PODIATRIST

## 2023-06-09 PROCEDURE — 82962 GLUCOSE BLOOD TEST: CPT

## 2023-06-09 PROCEDURE — 6370000000 HC RX 637 (ALT 250 FOR IP): Performed by: ANESTHESIOLOGY

## 2023-06-09 PROCEDURE — 84132 ASSAY OF SERUM POTASSIUM: CPT

## 2023-06-09 PROCEDURE — 2580000003 HC RX 258: Performed by: ANESTHESIOLOGY

## 2023-06-09 PROCEDURE — 2500000003 HC RX 250 WO HCPCS: Performed by: NURSE ANESTHETIST, CERTIFIED REGISTERED

## 2023-06-09 PROCEDURE — 85018 HEMOGLOBIN: CPT

## 2023-06-09 PROCEDURE — 2500000003 HC RX 250 WO HCPCS: Performed by: PODIATRIST

## 2023-06-09 PROCEDURE — 6360000002 HC RX W HCPCS: Performed by: NURSE ANESTHETIST, CERTIFIED REGISTERED

## 2023-06-09 RX ORDER — BUPIVACAINE HYDROCHLORIDE 5 MG/ML
INJECTION, SOLUTION EPIDURAL; INTRACAUDAL PRN
Status: DISCONTINUED | OUTPATIENT
Start: 2023-06-09 | End: 2023-06-09 | Stop reason: ALTCHOICE

## 2023-06-09 RX ORDER — SODIUM CHLORIDE 0.9 % (FLUSH) 0.9 %
5-40 SYRINGE (ML) INJECTION EVERY 12 HOURS SCHEDULED
Status: DISCONTINUED | OUTPATIENT
Start: 2023-06-09 | End: 2023-06-09 | Stop reason: HOSPADM

## 2023-06-09 RX ORDER — METOCLOPRAMIDE HYDROCHLORIDE 5 MG/ML
10 INJECTION INTRAMUSCULAR; INTRAVENOUS
Status: DISCONTINUED | OUTPATIENT
Start: 2023-06-09 | End: 2023-06-09 | Stop reason: HOSPADM

## 2023-06-09 RX ORDER — FENTANYL CITRATE 50 UG/ML
100 INJECTION, SOLUTION INTRAMUSCULAR; INTRAVENOUS
Status: DISCONTINUED | OUTPATIENT
Start: 2023-06-09 | End: 2023-06-09 | Stop reason: HOSPADM

## 2023-06-09 RX ORDER — ONDANSETRON 2 MG/ML
4 INJECTION INTRAMUSCULAR; INTRAVENOUS
Status: DISCONTINUED | OUTPATIENT
Start: 2023-06-09 | End: 2023-06-09 | Stop reason: HOSPADM

## 2023-06-09 RX ORDER — LIDOCAINE HYDROCHLORIDE 20 MG/ML
INJECTION, SOLUTION EPIDURAL; INFILTRATION; INTRACAUDAL; PERINEURAL PRN
Status: DISCONTINUED | OUTPATIENT
Start: 2023-06-09 | End: 2023-06-09 | Stop reason: SDUPTHER

## 2023-06-09 RX ORDER — OXYCODONE HYDROCHLORIDE 5 MG/1
5 TABLET ORAL
Status: DISCONTINUED | OUTPATIENT
Start: 2023-06-09 | End: 2023-06-09 | Stop reason: HOSPADM

## 2023-06-09 RX ORDER — SODIUM CHLORIDE, SODIUM LACTATE, POTASSIUM CHLORIDE, CALCIUM CHLORIDE 600; 310; 30; 20 MG/100ML; MG/100ML; MG/100ML; MG/100ML
INJECTION, SOLUTION INTRAVENOUS CONTINUOUS
Status: DISCONTINUED | OUTPATIENT
Start: 2023-06-09 | End: 2023-06-09 | Stop reason: HOSPADM

## 2023-06-09 RX ORDER — LIDOCAINE HYDROCHLORIDE 10 MG/ML
INJECTION, SOLUTION INFILTRATION; PERINEURAL PRN
Status: DISCONTINUED | OUTPATIENT
Start: 2023-06-09 | End: 2023-06-09 | Stop reason: ALTCHOICE

## 2023-06-09 RX ORDER — VANCOMYCIN HYDROCHLORIDE 1 G/20ML
INJECTION, POWDER, LYOPHILIZED, FOR SOLUTION INTRAVENOUS PRN
Status: DISCONTINUED | OUTPATIENT
Start: 2023-06-09 | End: 2023-06-09 | Stop reason: ALTCHOICE

## 2023-06-09 RX ORDER — FENTANYL CITRATE 50 UG/ML
25 INJECTION, SOLUTION INTRAMUSCULAR; INTRAVENOUS EVERY 5 MIN PRN
Status: DISCONTINUED | OUTPATIENT
Start: 2023-06-09 | End: 2023-06-09 | Stop reason: HOSPADM

## 2023-06-09 RX ORDER — SCOLOPAMINE TRANSDERMAL SYSTEM 1 MG/1
1 PATCH, EXTENDED RELEASE TRANSDERMAL
Status: DISCONTINUED | OUTPATIENT
Start: 2023-06-09 | End: 2023-06-09 | Stop reason: HOSPADM

## 2023-06-09 RX ORDER — HYDROMORPHONE HYDROCHLORIDE 2 MG/ML
0.5 INJECTION, SOLUTION INTRAMUSCULAR; INTRAVENOUS; SUBCUTANEOUS EVERY 10 MIN PRN
Status: DISCONTINUED | OUTPATIENT
Start: 2023-06-09 | End: 2023-06-09 | Stop reason: HOSPADM

## 2023-06-09 RX ORDER — DIPHENHYDRAMINE HYDROCHLORIDE 50 MG/ML
12.5 INJECTION INTRAMUSCULAR; INTRAVENOUS
Status: DISCONTINUED | OUTPATIENT
Start: 2023-06-09 | End: 2023-06-09 | Stop reason: HOSPADM

## 2023-06-09 RX ORDER — DEXAMETHASONE SODIUM PHOSPHATE 4 MG/ML
INJECTION, SOLUTION INTRA-ARTICULAR; INTRALESIONAL; INTRAMUSCULAR; INTRAVENOUS; SOFT TISSUE PRN
Status: DISCONTINUED | OUTPATIENT
Start: 2023-06-09 | End: 2023-06-09 | Stop reason: ALTCHOICE

## 2023-06-09 RX ORDER — PROPOFOL 10 MG/ML
INJECTION, EMULSION INTRAVENOUS CONTINUOUS PRN
Status: DISCONTINUED | OUTPATIENT
Start: 2023-06-09 | End: 2023-06-09 | Stop reason: SDUPTHER

## 2023-06-09 RX ORDER — SODIUM CHLORIDE 9 MG/ML
INJECTION, SOLUTION INTRAVENOUS PRN
Status: DISCONTINUED | OUTPATIENT
Start: 2023-06-09 | End: 2023-06-09 | Stop reason: HOSPADM

## 2023-06-09 RX ORDER — SODIUM CHLORIDE 0.9 % (FLUSH) 0.9 %
5-40 SYRINGE (ML) INJECTION PRN
Status: DISCONTINUED | OUTPATIENT
Start: 2023-06-09 | End: 2023-06-09 | Stop reason: HOSPADM

## 2023-06-09 RX ORDER — MIDAZOLAM HYDROCHLORIDE 2 MG/2ML
2 INJECTION, SOLUTION INTRAMUSCULAR; INTRAVENOUS
Status: DISCONTINUED | OUTPATIENT
Start: 2023-06-09 | End: 2023-06-09 | Stop reason: HOSPADM

## 2023-06-09 RX ORDER — PROPOFOL 10 MG/ML
INJECTION, EMULSION INTRAVENOUS PRN
Status: DISCONTINUED | OUTPATIENT
Start: 2023-06-09 | End: 2023-06-09 | Stop reason: SDUPTHER

## 2023-06-09 RX ADMIN — LIDOCAINE HYDROCHLORIDE 20 MG: 20 INJECTION, SOLUTION EPIDURAL; INFILTRATION; INTRACAUDAL; PERINEURAL at 08:44

## 2023-06-09 RX ADMIN — PROPOFOL 75 MCG/KG/MIN: 10 INJECTION, EMULSION INTRAVENOUS at 08:49

## 2023-06-09 RX ADMIN — Medication 2000 MG: at 08:39

## 2023-06-09 RX ADMIN — PROPOFOL 50 MG: 10 INJECTION, EMULSION INTRAVENOUS at 08:44

## 2023-06-09 RX ADMIN — PROPOFOL 30 MG: 10 INJECTION, EMULSION INTRAVENOUS at 08:54

## 2023-06-09 RX ADMIN — SODIUM CHLORIDE, POTASSIUM CHLORIDE, SODIUM LACTATE AND CALCIUM CHLORIDE: 600; 310; 30; 20 INJECTION, SOLUTION INTRAVENOUS at 07:40

## 2023-06-09 RX ADMIN — PROPOFOL 20 MG: 10 INJECTION, EMULSION INTRAVENOUS at 08:58

## 2023-06-09 ASSESSMENT — PAIN SCALES - GENERAL: PAINLEVEL_OUTOF10: 0

## 2023-06-09 NOTE — ANESTHESIA POSTPROCEDURE EVALUATION
Department of Anesthesiology  Postprocedure Note    Patient: Joyce Dixon  MRN: [id number]  YOB: 1950  Date of evaluation: 6/9/2023      Procedure Summary     Date: 06/09/23 Room / Location: [facility] OP OR 06 / [facility]    Anesthesia Start: 3789 Anesthesia Stop: 1017    Procedures:       OSTEOTOMY RIGHT FOOT BUNIONECTOMY WITH FIXATION WITH POSSIBLE AKIN RIGHT FOOT/LOCAL (Right: Foot)      RIGHT FOOT ARTHRODESIS PROXIMAL INTERPHALANGEAL JOINT 2ND TOE/LOCAL (Right: Foot) Diagnosis:       Acquired hallux valgus of right foot      Other hammer toe(s) (acquired), right foot      Pain in right foot      (Acquired hallux valgus of right foot [M20.11])      (Other hammer toe(s) (acquired), right foot [M20.41])      (Pain in right foot [M79.671])    Surgeons: Hazel Walden MD Responsible Provider: Kendra Acosta MD    Anesthesia Type: TIVA ASA Status: 2          Anesthesia Type: No value filed.     Ariane Phase I: Ariane Score: 10    Ariane Phase II: Ariane Score: 10      Anesthesia Post Evaluation    Patient location during evaluation: PACU  Patient participation: complete - patient participated  Level of consciousness: awake and alert  Airway patency: patent  Nausea & Vomiting: no nausea and no vomiting  Complications: no  Cardiovascular status: hemodynamically stable  Respiratory status: acceptable, nonlabored ventilation and spontaneous ventilation  Hydration status: euvolemic  Comments: BP (!) 170/77   Pulse 56   Temp 97.5 °F (36.4 °C) (Temporal)   Resp 16   Ht 5' (1.524 m)   Wt 136 lb (61.7 kg)   SpO2 95%   BMI 26.56 kg/m²     Multimodal analgesia pain management approach

## 2023-06-09 NOTE — BRIEF OP NOTE
Brief Postoperative Note      Patient: Mahi Villareal  YOB: 1950  MRN: [id number]    Date of Procedure: 6/9/2023    Pre-Op Diagnosis Codes:     * Acquired hallux valgus of right foot [M20.11]     * Other hammer toe(s) (acquired), right foot [M20.41]     * Pain in right foot [M79.671]    Post-Op Diagnosis: same       Procedure(s):  OSTEOTOMY RIGHT FOOT BUNIONECTOMY WITH FIXATION WITH POSSIBLE AKIN RIGHT FOOT/LOCAL  RIGHT FOOT ARTHRODESIS PROXIMAL INTERPHALANGEAL JOINT 2ND TOE/LOCAL    Surgeon(s):  Ginny Ramos MD    Assistant:  none    Anesthesia: Monitor Anesthesia Care    Estimated Blood Loss (mL): minimal    Complications: none    Specimens:   none    Implants:  Implant Name Type Inv.  Item Serial No.  Lot No. LRB No. Used Action   Dart[employer] Edge Cannulated Screw [id number] short 2.5mm X14mm     [id number] Right 1 Implanted   [employer] Edge Cannulated Screw [id number]     [id number] Right 1 Implanted   COMPONENT TOE JT L15MM 0DEG NEUT PROX INTERPHALANGEAL YEL [id number]] [employer] HOW] - [id number]  COMPONENT TOE JT L15MM 0DEG NEUT PROX INTERPHALANGEAL YEL [id number]] [employer] HOW]  [employer] HOW-WD [id number] Right 1 Implanted         Drains: none    Findings: dictated      Electronically signed by Dagoberto May MD on 6/9/2023 at 10:09 AM

## 2023-06-09 NOTE — PROCEDURES
base of the intermediate phalanx were removed. At this time, utilizing the protocol, the Smart Toe implantation was done, the size was 15 mm. At this time, intraoperative radiograph showed satisfactory alignment for both the bunion deformity and the second digit. At this time, the second digit was irrigated with copious amount of sterile saline. Deep closure was achieved utilizing 3-0 and 4-0 Vicryl and skin was closed utilizing 4-0 Stratafix Monocryl in running subcuticular fashion. Postoperatively, the surgical areas were injected with approximately 6 to 7 mL of 0.5% Marcaine mixed with 2 mg of dexamethasone for prolonged anesthesia. The wound was dressed with a Steri-Strip, Betadine-soaked Adaptic, plain 4x4s, Kerlix, Coban. At this time, the tourniquet was released and instant warming and pinking of the digits were noted indicating good return of the vascular supply. The patient left the operating room in apparent satisfactory condition. Postoperatively, she already has her written postoperative instructions. She already has her postop medications. She has been provided with numbers to call if any questions or concerns arise. She has been provided with a surgical shoe to ambulate with which she is not to take it off at all until I see her. I have advised of that also to her  and her daughter. Her prognosis remains guarded. She is to come and see me in approximately five to seven days for followup.       VIRGILIO [id number]  D:  06/09/2023 10:17  T:  06/09/2023 17:17  JOB #:  [id number]

## 2023-06-09 NOTE — ANESTHESIA PRE PROCEDURE
AGRATIO, LABGLOM, GLUCOSE, GLU, PROT, CALCIUM, BILITOT, ALKPHOS, AST, ALT    POC Tests:   Recent Labs     06/09/23  0740 06/09/23  0742   POCGLU 87  --    POCK  --  3.9       Coags: No results found for: PROTIME, INR, APTT    HCG (If Applicable): No results found for: PREGTESTUR, PREGSERUM, HCG, HCGQUANT     ABGs: No results found for: PHART, PO2ART, UAT6CLN, STY0JBL, BEART, R7UUAKAQ     Type & Screen (If Applicable):  No results found for: LABABO, LABRH    Drug/Infectious Status (If Applicable):  No results found for: HIV, HEPCAB    COVID-19 Screening (If Applicable): No results found for: COVID19        Anesthesia Evaluation  Patient summary reviewed  Airway: Mallampati: II  TM distance: >3 FB   Neck ROM: full     Dental: normal exam         Pulmonary:normal exam  breath sounds clear to auscultation  (+) asthma:                            Cardiovascular:  Exercise tolerance: good (>4 METS),   (+) hypertension:,     (-) murmur      Rhythm: regular  Rate: normal                    Neuro/Psych:   Negative Neuro/Psych ROS              GI/Hepatic/Renal: Neg GI/Hepatic/Renal ROS            Endo/Other:    (+) DiabetesType II DM, , : arthritis: OA., .                  ROS comment: Undifferentiated connective tissue disease Abdominal:              PE comment: Deferred   Vascular:           ROS comment: Mixed collagen vascular disease. Other Findings:           Anesthesia Plan      TIVA     ASA 2       Induction: intravenous. Anesthetic plan and risks discussed with patient.                         Margarita Jeff MD   6/9/2023

## 2023-06-09 NOTE — H&P
Department of Anesthesiology  History and Physical Note                                        Name:  Mahi Villareal       Age:  74 y.o.  :  1950                                                 MRN:  [id number]                                                                      Date:  2023                Surgeon: Sergio Kothari):  Ginny Ramos MD     CC: Right foot pain    HPI: 73 y/o female presenting for the below procedure due to left foot pain. Procedure: Procedure(s):  OSTEOTOMY RIGHT FOOT BUNIONECTOMY WITH FIXATION WITH POSSIBLE AKIN RIGHT FOOT/LOCAL  RIGHT FOOT ARTHRODESIS PROXIMAL INTERPHALANGEAL JOINT 2ND TOE/LOCAL     Medications prior to admission:   Home Medications           Prior to Admission medications    Medication Sig Start Date End Date Taking? Authorizing Provider   acetaminophen (TYLENOL) 500 MG tablet Take 1 tablet by mouth as needed 13   Yes Historical Provider, MD   albuterol sulfate HFA (PROVENTIL;VENTOLIN;PROAIR) 108 (90 Base) MCG/ACT inhaler Inhale 2 puffs into the lungs every 4 hours as needed 13   Yes Historical Provider, MD   amLODIPine (NORVASC) 10 MG tablet Take 1 tablet by mouth daily 21   Yes Historical Provider, MD   atorvastatin (LIPITOR) 80 MG tablet Take 1 tablet by mouth daily 13   Yes Historical Provider, MD   Ferrous Sulfate 324 MG TBEC Take 324 mg by mouth daily (with breakfast) 3/18/22   Yes Historical Provider, MD   fluticasone furoate-vilanterol (BREO ELLIPTA) 100-25 MCG/ACT inhaler Inhale 1 puff into the lungs 11/15/21   Yes Historical Provider, MD   fluticasone (FLONASE) 50 MCG/ACT nasal spray 2 sprays by Nasal route daily 21   Yes Historical Provider, MD   gabapentin (NEURONTIN) 400 MG capsule Take 1 capsule by mouth 3 times daily.  14   Yes Historical Provider, MD   hydroCHLOROthiazide (HYDRODIURIL) 25 MG tablet Take 1 tablet by mouth daily 14   Yes Historical Provider, MD   levocetirizine (XYZAL) 5 MG tablet 1 tablet

## 2023-06-09 NOTE — DISCHARGE INSTRUCTIONS
[facility]    Post operative instructions and recommendations for your personal comfort following foot surgery:    1. Upon arrival home, lie down and elevate your feet and legs approximately 16\" on pillows. Also, support your knees with pillows. 2. Post-operative swelling is greatly reduced by the use of ice packs. Ice packs should be applied over the top or bottom of the bandage every twenty minutes on the hour until returning to the office. 3. Take medication prescribed by the doctor according to directions. Avoid taking medication on an empty stomach. 4. Remain quiet and off of your feet as much as possible for the first 48 hours. After this period use discretion and common sense regarding the amount of standing or walking you do. Generally, post-operative patients should stay off their feet for 24-48hours. After this period walking to tolerance is usually allowed (unless otherwise specified by your doctor). 5. Do not be alarmed if there is some slight bleeding on the bandages; good blood supply is essential for normal tissue healing. 6. Keep feet elevated as much as possible for the first three days. Generally above the chest is most desirable. 7. Keep bandages completely dry. 8. Do not remove the surgical bandages. 9. The successful result of your surgery depends on the careful following of these instructions. 10. Please refrain from all alcoholic beverages. 11. If there are any questions or problems, please feel free to phone the doctor at the office. Your follow-up appointment has been scheduled for **, 2009 at the [place] office.     Maame Garcia, [facility] [address] [place] [address] [phone]  [address] [facility] [phone]    Dr. Lindsay Siegel [facility]--[phone]

## 2023-11-02 ENCOUNTER — ANESTHESIA EVENT (OUTPATIENT)
Dept: SURGERY | Age: 73
End: 2023-11-02
Payer: OTHER GOVERNMENT

## 2023-11-02 RX ORDER — FAMOTIDINE 40 MG/1
TABLET, FILM COATED ORAL DAILY PRN
COMMUNITY
Start: 2023-04-13

## 2023-11-02 RX ORDER — MIDODRINE HYDROCHLORIDE 5 MG/1
5 TABLET ORAL 3 TIMES DAILY
COMMUNITY

## 2023-11-02 RX ORDER — ASCORBIC ACID 500 MG
500 TABLET ORAL DAILY
COMMUNITY

## 2023-11-02 RX ORDER — MONTELUKAST SODIUM 10 MG/1
10 TABLET ORAL DAILY
COMMUNITY

## 2023-11-02 RX ORDER — CARVEDILOL 12.5 MG/1
12.5 TABLET ORAL 2 TIMES DAILY WITH MEALS
COMMUNITY
Start: 2023-09-06 | End: 2023-11-02

## 2023-11-02 NOTE — PERIOP NOTE
Preop department called to notify patient of arrival time for scheduled procedure. Instructions given to   - Arrive at [address] [facility]. - Remain NPO after midnight, unless otherwise indicated, including gum, mints, and ice chips. - Have a responsible adult to drive patient to the hospital, stay during surgery, and patient will need supervision 24 hours after anesthesia. - Use antibacterial soap in shower the night before surgery and on the morning of surgery. Was patient contacted: Left message on patients spouses mailbox.    Voicemail left: yes  Numbers contacted: [phone]   Arrival time: 0830   Pt called back to [facility]

## 2023-11-02 NOTE — PERIOP NOTE
Patient verified name and . Order for consent was found in EHR and matches case posting; patient verifies procedure. Type 1B surgery, phone assessment complete. Orders were received. Labs per surgeon: none  Labs per anesthesia protocol: poc glucose    Patient answered medical/surgical history questions at their best of ability. All prior to admission medications documented in EPIC. Patient instructed to take the following medications the day of surgery according to anesthesia guidelines with a small sip of water: Albuterol (Ventolin/ Pro-air) use and bring, Aspirin 81 mg, Atorvastatin (Lipitor), breo inhaler, Famotidine (Pepcid), Fluticasone propionate (Flonase), gabapentin (Neurontin), Metoprolol (Lopressor), midodrine, Montelukast (Singulair)   On the day before surgery please take Tylenol (Acetaminophen) 1000mg in the morning and then again before bed. You may substitute for Tylenol 650 mg. Hold all vitamins 7 days prior to surgery and NSAIDS 5 days prior to surgery. Prescription meds to hold: Hydrochlorothiazide (HCTZ) and Metformin (Glucophage)  Patient instructed on the following:    > Arrive at [address] [place] [address], time of arrival to be called the day before by 1700  > NPO after midnight, unless otherwise indicated, including gum, mints, and ice chips  > Responsible adult must drive patient to the hospital, stay during surgery, and patient will need supervision 24 hours after anesthesia  > Use Antibacterial soap in shower the night before surgery and on the morning of surgery  > All piercings must be removed prior to arrival.    > Leave all valuables (money and jewelry) at home but bring insurance card and ID on DOS.   > You may be required to pay a deductible or co-pay on the day of your procedure. You can pre-pay by calling [phone] if your surgery is at the [facility] [place] or [phone] if your surgery is at the [facility].   > Do not wear make-up, nail polish, lotions, cologne, perfumes, powders, or oil on skin. Artificial nails are not permitted.

## 2023-11-03 ENCOUNTER — APPOINTMENT (OUTPATIENT)
Dept: GENERAL RADIOLOGY | Age: 73
End: 2023-11-03
Attending: PODIATRIST
Payer: OTHER GOVERNMENT

## 2023-11-03 ENCOUNTER — ANESTHESIA (OUTPATIENT)
Dept: SURGERY | Age: 73
End: 2023-11-03
Payer: OTHER GOVERNMENT

## 2023-11-03 ENCOUNTER — HOSPITAL ENCOUNTER (OUTPATIENT)
Age: 73
Discharge: HOME | End: 2023-11-03
Attending: PODIATRIST | Admitting: PODIATRIST
Payer: OTHER GOVERNMENT

## 2023-11-03 VITALS
HEART RATE: 65 BPM | SYSTOLIC BLOOD PRESSURE: 161 MMHG | TEMPERATURE: 97.2 F | RESPIRATION RATE: 13 BRPM | OXYGEN SATURATION: 96 % | DIASTOLIC BLOOD PRESSURE: 74 MMHG | HEIGHT: 60 IN | WEIGHT: 136 LBS | BODY MASS INDEX: 26.7 KG/M2

## 2023-11-03 DIAGNOSIS — M20.12 HALLUX ABDUCTOVALGUS WITH BUNIONS, LEFT: Primary | ICD-10-CM

## 2023-11-03 DIAGNOSIS — M21.612 HALLUX ABDUCTOVALGUS WITH BUNIONS, LEFT: Primary | ICD-10-CM

## 2023-11-03 LAB
GLUCOSE BLD STRIP.AUTO-MCNC: 95 MG/DL (ref 65–100)
POTASSIUM BLD-SCNC: 4.4 MMOL/L (ref 3.5–5.1)
SERVICE CMNT-IMP: NORMAL

## 2023-11-03 PROCEDURE — C1776 JOINT DEVICE (IMPLANTABLE): HCPCS | Performed by: PODIATRIST

## 2023-11-03 PROCEDURE — 2500000003 HC RX 250 WO HCPCS: Performed by: REGISTERED NURSE

## 2023-11-03 PROCEDURE — 7100000001 HC PACU RECOVERY - ADDTL 15 MIN: Performed by: PODIATRIST

## 2023-11-03 PROCEDURE — 7100000000 HC PACU RECOVERY - FIRST 15 MIN: Performed by: PODIATRIST

## 2023-11-03 PROCEDURE — 6360000002 HC RX W HCPCS: Performed by: PODIATRIST

## 2023-11-03 PROCEDURE — 2500000003 HC RX 250 WO HCPCS: Performed by: PODIATRIST

## 2023-11-03 PROCEDURE — 2580000003 HC RX 258: Performed by: PODIATRIST

## 2023-11-03 PROCEDURE — 6360000002 HC RX W HCPCS: Performed by: REGISTERED NURSE

## 2023-11-03 PROCEDURE — 3700000000 HC ANESTHESIA ATTENDED CARE: Performed by: PODIATRIST

## 2023-11-03 PROCEDURE — 3600000004 HC SURGERY LEVEL 4 BASE: Performed by: PODIATRIST

## 2023-11-03 PROCEDURE — 7100000010 HC PHASE II RECOVERY - FIRST 15 MIN: Performed by: PODIATRIST

## 2023-11-03 PROCEDURE — C1713 ANCHOR/SCREW BN/BN,TIS/BN: HCPCS | Performed by: PODIATRIST

## 2023-11-03 PROCEDURE — 82962 GLUCOSE BLOOD TEST: CPT

## 2023-11-03 PROCEDURE — 2720000010 HC SURG SUPPLY STERILE: Performed by: PODIATRIST

## 2023-11-03 PROCEDURE — 2580000003 HC RX 258: Performed by: STUDENT IN AN ORGANIZED HEALTH CARE EDUCATION/TRAINING PROGRAM

## 2023-11-03 PROCEDURE — 2709999900 HC NON-CHARGEABLE SUPPLY: Performed by: PODIATRIST

## 2023-11-03 PROCEDURE — 6370000000 HC RX 637 (ALT 250 FOR IP): Performed by: STUDENT IN AN ORGANIZED HEALTH CARE EDUCATION/TRAINING PROGRAM

## 2023-11-03 PROCEDURE — 3700000001 HC ADD 15 MINUTES (ANESTHESIA): Performed by: PODIATRIST

## 2023-11-03 PROCEDURE — 84132 ASSAY OF SERUM POTASSIUM: CPT

## 2023-11-03 PROCEDURE — 7100000011 HC PHASE II RECOVERY - ADDTL 15 MIN: Performed by: PODIATRIST

## 2023-11-03 PROCEDURE — 3600000014 HC SURGERY LEVEL 4 ADDTL 15MIN: Performed by: PODIATRIST

## 2023-11-03 DEVICE — OSTEOSYNTHESIS COMPRESSION STAPLE
Type: IMPLANTABLE DEVICE | Site: FOOT | Status: FUNCTIONAL
Brand: EASY CLIP

## 2023-11-03 DEVICE — IMPLANTABLE DEVICE: Type: IMPLANTABLE DEVICE | Site: FOOT | Status: FUNCTIONAL

## 2023-11-03 DEVICE — SCREW BONE DART-FIRE SHORT HEADLESS 3.0MMX14MM: Type: IMPLANTABLE DEVICE | Site: FOOT | Status: FUNCTIONAL

## 2023-11-03 DEVICE — PACK INST HEADLESS 3.0 MM DART FIRE: Type: IMPLANTABLE DEVICE | Site: FOOT | Status: FUNCTIONAL

## 2023-11-03 RX ORDER — LIDOCAINE HYDROCHLORIDE 10 MG/ML
1 INJECTION, SOLUTION INFILTRATION; PERINEURAL
Status: DISCONTINUED | OUTPATIENT
Start: 2023-11-03 | End: 2023-11-03 | Stop reason: HOSPADM

## 2023-11-03 RX ORDER — SODIUM CHLORIDE, SODIUM LACTATE, POTASSIUM CHLORIDE, CALCIUM CHLORIDE 600; 310; 30; 20 MG/100ML; MG/100ML; MG/100ML; MG/100ML
INJECTION, SOLUTION INTRAVENOUS CONTINUOUS
Status: DISCONTINUED | OUTPATIENT
Start: 2023-11-03 | End: 2023-11-03 | Stop reason: HOSPADM

## 2023-11-03 RX ORDER — SODIUM CHLORIDE 0.9 % (FLUSH) 0.9 %
5-40 SYRINGE (ML) INJECTION PRN
Status: DISCONTINUED | OUTPATIENT
Start: 2023-11-03 | End: 2023-11-03 | Stop reason: HOSPADM

## 2023-11-03 RX ORDER — SODIUM CHLORIDE 9 MG/ML
INJECTION, SOLUTION INTRAVENOUS PRN
Status: DISCONTINUED | OUTPATIENT
Start: 2023-11-03 | End: 2023-11-03 | Stop reason: HOSPADM

## 2023-11-03 RX ORDER — LIDOCAINE HYDROCHLORIDE 10 MG/ML
INJECTION, SOLUTION INFILTRATION; PERINEURAL PRN
Status: DISCONTINUED | OUTPATIENT
Start: 2023-11-03 | End: 2023-11-03 | Stop reason: ALTCHOICE

## 2023-11-03 RX ORDER — SODIUM CHLORIDE 0.9 % (FLUSH) 0.9 %
5-40 SYRINGE (ML) INJECTION EVERY 12 HOURS SCHEDULED
Status: DISCONTINUED | OUTPATIENT
Start: 2023-11-03 | End: 2023-11-03 | Stop reason: HOSPADM

## 2023-11-03 RX ORDER — BUPIVACAINE HYDROCHLORIDE 5 MG/ML
INJECTION, SOLUTION EPIDURAL; INTRACAUDAL PRN
Status: DISCONTINUED | OUTPATIENT
Start: 2023-11-03 | End: 2023-11-03 | Stop reason: ALTCHOICE

## 2023-11-03 RX ORDER — ACETAMINOPHEN AND CODEINE PHOSPHATE 300; 30 MG/1; MG/1
1 TABLET ORAL EVERY 4 HOURS PRN
Qty: 30 TABLET | Refills: 0 | Status: SHIPPED | OUTPATIENT
Start: 2023-11-03 | End: 2023-11-08

## 2023-11-03 RX ORDER — FENTANYL CITRATE 50 UG/ML
100 INJECTION, SOLUTION INTRAMUSCULAR; INTRAVENOUS
Status: DISCONTINUED | OUTPATIENT
Start: 2023-11-03 | End: 2023-11-03 | Stop reason: HOSPADM

## 2023-11-03 RX ORDER — CLINDAMYCIN HYDROCHLORIDE 300 MG/1
300 CAPSULE ORAL 3 TIMES DAILY
Qty: 21 CAPSULE | Refills: 0 | Status: SHIPPED | OUTPATIENT
Start: 2023-11-03 | End: 2023-11-10

## 2023-11-03 RX ORDER — DEXAMETHASONE SODIUM PHOSPHATE 4 MG/ML
INJECTION, SOLUTION INTRA-ARTICULAR; INTRALESIONAL; INTRAMUSCULAR; INTRAVENOUS; SOFT TISSUE PRN
Status: DISCONTINUED | OUTPATIENT
Start: 2023-11-03 | End: 2023-11-03 | Stop reason: ALTCHOICE

## 2023-11-03 RX ORDER — ACETAMINOPHEN 500 MG
1000 TABLET ORAL ONCE
Status: COMPLETED | OUTPATIENT
Start: 2023-11-03 | End: 2023-11-03

## 2023-11-03 RX ORDER — MIDAZOLAM HYDROCHLORIDE 2 MG/2ML
2 INJECTION, SOLUTION INTRAMUSCULAR; INTRAVENOUS
Status: DISCONTINUED | OUTPATIENT
Start: 2023-11-03 | End: 2023-11-03 | Stop reason: HOSPADM

## 2023-11-03 RX ORDER — PROPOFOL 10 MG/ML
INJECTION, EMULSION INTRAVENOUS PRN
Status: DISCONTINUED | OUTPATIENT
Start: 2023-11-03 | End: 2023-11-03 | Stop reason: SDUPTHER

## 2023-11-03 RX ORDER — LIDOCAINE HYDROCHLORIDE 20 MG/ML
INJECTION, SOLUTION EPIDURAL; INFILTRATION; INTRACAUDAL; PERINEURAL PRN
Status: DISCONTINUED | OUTPATIENT
Start: 2023-11-03 | End: 2023-11-03 | Stop reason: SDUPTHER

## 2023-11-03 RX ORDER — OXYCODONE HYDROCHLORIDE 5 MG/1
5 TABLET ORAL
Status: DISCONTINUED | OUTPATIENT
Start: 2023-11-03 | End: 2023-11-03 | Stop reason: HOSPADM

## 2023-11-03 RX ORDER — PROCHLORPERAZINE EDISYLATE 5 MG/ML
5 INJECTION INTRAMUSCULAR; INTRAVENOUS
Status: DISCONTINUED | OUTPATIENT
Start: 2023-11-03 | End: 2023-11-03 | Stop reason: HOSPADM

## 2023-11-03 RX ORDER — GLYCOPYRROLATE 0.2 MG/ML
INJECTION INTRAMUSCULAR; INTRAVENOUS PRN
Status: DISCONTINUED | OUTPATIENT
Start: 2023-11-03 | End: 2023-11-03 | Stop reason: SDUPTHER

## 2023-11-03 RX ORDER — ONDANSETRON 2 MG/ML
4 INJECTION INTRAMUSCULAR; INTRAVENOUS
Status: DISCONTINUED | OUTPATIENT
Start: 2023-11-03 | End: 2023-11-03 | Stop reason: HOSPADM

## 2023-11-03 RX ORDER — METOPROLOL SUCCINATE 25 MG/1
25 TABLET, EXTENDED RELEASE ORAL ONCE
Status: COMPLETED | OUTPATIENT
Start: 2023-11-03 | End: 2023-11-03

## 2023-11-03 RX ORDER — HYDROMORPHONE HYDROCHLORIDE 2 MG/ML
0.5 INJECTION, SOLUTION INTRAMUSCULAR; INTRAVENOUS; SUBCUTANEOUS EVERY 5 MIN PRN
Status: DISCONTINUED | OUTPATIENT
Start: 2023-11-03 | End: 2023-11-03 | Stop reason: HOSPADM

## 2023-11-03 RX ADMIN — ACETAMINOPHEN 1000 MG: 500 TABLET, FILM COATED ORAL at 09:14

## 2023-11-03 RX ADMIN — PROPOFOL 40 MG: 10 INJECTION, EMULSION INTRAVENOUS at 12:48

## 2023-11-03 RX ADMIN — METOPROLOL SUCCINATE 25 MG: 25 TABLET, FILM COATED, EXTENDED RELEASE ORAL at 09:52

## 2023-11-03 RX ADMIN — GLYCOPYRROLATE 0.2 MG: 0.2 INJECTION INTRAMUSCULAR; INTRAVENOUS at 12:52

## 2023-11-03 RX ADMIN — Medication 2000 MG: at 12:45

## 2023-11-03 RX ADMIN — PROPOFOL 120 MCG/KG/MIN: 10 INJECTION, EMULSION INTRAVENOUS at 12:49

## 2023-11-03 RX ADMIN — LIDOCAINE HYDROCHLORIDE 40 MG: 20 INJECTION, SOLUTION EPIDURAL; INFILTRATION; INTRACAUDAL; PERINEURAL at 12:48

## 2023-11-03 RX ADMIN — SODIUM CHLORIDE, POTASSIUM CHLORIDE, SODIUM LACTATE AND CALCIUM CHLORIDE: 600; 310; 30; 20 INJECTION, SOLUTION INTRAVENOUS at 09:18

## 2023-11-03 ASSESSMENT — PAIN SCALES - GENERAL: PAINLEVEL_OUTOF10: 0

## 2023-11-03 NOTE — DISCHARGE INSTRUCTIONS
[facility]    Post operative instructions and recommendations for your personal comfort following foot surgery:    1. Upon arrival home, lie down and elevate your feet and legs approximately 16\" on pillows. Also, support your knees with pillows. 2. Post-operative swelling is greatly reduced by the use of ice packs. Ice packs should be applied over the top or bottom of the bandage every twenty minutes on the hour until returning to the office. 3. Take medication prescribed by the doctor according to directions. Avoid taking medication on an empty stomach. 4. Remain quiet and off of your feet as much as possible for the first 48 hours. After this period use discretion and common sense regarding the amount of standing or walking you do. Generally, post-operative patients should stay off their feet for 24-48hours. After this period walking to tolerance is usually allowed (unless otherwise specified by your doctor). 5. Do not be alarmed if there is some slight bleeding on the bandages; good blood supply is essential for normal tissue healing. 6. Keep feet elevated as much as possible for the first three days. Generally above the chest is most desirable. 7. Keep bandages completely dry. 8. Do not remove the surgical bandages. 9. The successful result of your surgery depends on the careful following of these instructions. 10. Please refrain from all alcoholic beverages. 11. If there are any questions or problems, please feel free to phone the doctor at the office. Your follow-up appointment has been scheduled for **, 2009 at the [place] office. [address] [place] [address],[address] Floor, [facility], [address] [place] [address] [phone]  Tiana Deng [facility], [address] [place] [address] [phone]    Dr. Marielos Falk  formerly [facility]--[phone]      MEDICATION INTERACTION:  During your procedure you potentially received a medication or medications which may reduce the effectiveness of oral contraceptives.  Please consider other

## 2023-11-03 NOTE — ANESTHESIA PRE PROCEDURE
Department of Anesthesiology  Preprocedure Note       Name:  Binu Vo   Age:  74 y.o.  :  1950                                          MRN:  [id number]         Date:  11/3/2023      Surgeon: Socrates Rothman):  Timo Shay MD    Procedure: Procedure(s):  OSTEOTOMY BUNIONECTOMY WITH FIXATION WITH AKIN LEFT FOOT ARTHRODESIS PROXIMAL INTERPHALANGEAL JOINT 2ND TOE LEFT FOOT    Medications prior to admission:   Prior to Admission medications    Medication Sig Start Date End Date Taking? Authorizing Provider   famotidine (PEPCID) 40 MG tablet daily as needed 23  Yes Osorio Mills MD   montelukast (SINGULAIR) 10 MG tablet Take 1 tablet by mouth daily   Yes Osorio Mills MD   vitamin C (ASCORBIC ACID) 500 MG tablet Take 1 tablet by mouth daily   Yes Osorio Mills MD   midodrine (PROAMATINE) 5 MG tablet Take 1 tablet by mouth 3 times daily    Osorio Mills MD   acetaminophen (TYLENOL) 500 MG tablet Take 1 tablet by mouth as needed 13   Osorio Mills MD   albuterol sulfate HFA (PROVENTIL;VENTOLIN;PROAIR) 108 (90 Base) MCG/ACT inhaler Inhale 2 puffs into the lungs every 4 hours as needed 13   Osorio Mills MD   Aspirin (VAZALORE) 81 MG CAPS Take 1 capsule by mouth daily    Osorio Mills MD   atorvastatin (LIPITOR) 80 MG tablet Take 1 tablet by mouth daily 13   Osorio Mills MD   vitamin D 25 MCG (1000 UT) CAPS Take 1 capsule by mouth daily    Osorio Mills MD   Ferrous Sulfate 324 MG TBEC Take 324 mg by mouth daily (with breakfast) 3/18/22   Osorio Mills MD   fluticasone furoate-vilanterol (BREO ELLIPTA) 100-25 MCG/ACT inhaler Inhale 1 puff into the lungs 11/15/21   Osorio Mills MD   fluticasone (FLONASE) 50 MCG/ACT nasal spray 2 sprays by Nasal route daily 21   Osorio Mills MD   gabapentin (NEURONTIN) 400 MG capsule Take 1 capsule by mouth 3 times daily.  14   Osorio Mills MD

## 2023-11-07 NOTE — PROCEDURES
performed. Attention was then again directed on the medial aspect of the first metatarsal head where a V-shaped osteotomy was performed, apex was distally, base was proximally, dorsal arm was longer than the plantar arm. The capital fragment was shifted laterally and impacted upon the shaft, it was noted to be quite stable. It was further stabilized utilizing two headless compression screws 3.0 x 14 mm. Any redundant bone was osteotomized and removed from the surgical site. Attention was then directed to the base of the proximal phalanx of the hallux. At this time, it was freed from the surrounding soft tissues. The periosteum was  and an osteotomy was made. The direction of the osteotomy, the base was medially and the apex was laterally. Approximately a millimeter or two wedge was osteotomized and removed from the surgical site. At this time, both edges were coapted and it was stabilized utilizing 8 mm compression screw. At this time, the entire area was irrigated with copious amount of sterile saline. Deep closure was achieved utilizing 3-0 Vicryl. The skin was closed utilizing 4-0 Stratafix Monocryl. Attention was then directed to the second digit, which was noted to be quite deformed at the proximal interphalangeal joint and the mild dorsiflexory contracture was noted at the MPJ. A dorsolinear incision of approximately 2-3 cm was placed starting at the MPJ area proximally and distally into the intermediate phalanx. The incision was carried down to the deeper layer via sharp and dull dissection taking care of neurovascular structures as encountered. A transverse capsulotomy was performed at the proximal interphalangeal joint. Head of the proximal phalanx and the base of the intermediate phalanx were exposed into the surgical site. Both of the cartilaginous surfaces were denuded at this time. At this time, following the protocol for the Smart Toe, a 16 mm Smart Toe was implanted.

## (undated) DEVICE — SPLINT THMB W5XL30IN FBRGLS PD PRECUT LTWT DURABLE FAST SET

## (undated) DEVICE — SUTURE NONABSORBABLE MONOFILAMENT 4-0 PS-2 18 IN BLU PROLENE 8682H

## (undated) DEVICE — JELLY LUBRICATING 10GM PREFIL SYR LUBE

## (undated) DEVICE — STERILE PVP: Brand: MEDLINE INDUSTRIES, INC.

## (undated) DEVICE — PODIATRY: Brand: MEDLINE INDUSTRIES, INC.

## (undated) DEVICE — NEEDLE HYPO 23GA L1.5IN TURQ POLYPR HUB S STL THN WALL IM

## (undated) DEVICE — BANDAGE,GAUZE,CONFORMING,3"X75",STRL,LF: Brand: MEDLINE

## (undated) DEVICE — PADDING CAST W4INXL4YD ST COT COHESIVE HND TEARABLE SPEC

## (undated) DEVICE — SUTURE NONABSORBABLE BRAIDED 2-0 CT-2 1X30 IN ETHBND EXCEL X411H

## (undated) DEVICE — SUTURE MCRYL SZ 4-0 L27IN ABSRB UD L19MM PS-2 1/2 CIR PRIM Y426H

## (undated) DEVICE — GARMENT,MEDLINE,DVT,INT,CALF,LG, GEN2: Brand: MEDLINE

## (undated) DEVICE — DRESSING PETRO W3XL8IN N ADH KNIT CELOS ACETT ADPTC

## (undated) DEVICE — SOLUTION IRRIG 1000ML 0.9% SOD CHL USP POUR PLAS BTL

## (undated) DEVICE — DRESSING PETRO W3XL18IN WHT GZ FOR N ADH WND CURAD

## (undated) DEVICE — BNDG,ELSTC,MATRIX,STRL,6"X5YD,LF,HOOK&LP: Brand: MEDLINE

## (undated) DEVICE — PADDING CAST COHESIVE 4 YDX3 IN HND TEARABLE COTTON SPEC 100

## (undated) DEVICE — SYRINGE 20ML LL S/C 50

## (undated) DEVICE — GLOVE SURG SZ 7 CRM LTX FREE POLYISOPRENE POLYMER BEAD ANTI

## (undated) DEVICE — SUTURE STRATAFIX SPRL MCRYL + SZ 4-0 L12IN ABSRB UD PS-2 SXMP1B117

## (undated) DEVICE — SPONGE GZ W4XL4IN COT 12 PLY TYP VII WVN C FLD DSGN STERILE

## (undated) DEVICE — STANDARD DRILL BIT , AO

## (undated) DEVICE — SUTURE VCRL SZ 3-0 L27IN ABSRB UD L19MM PS-2 3/8 CIR PRIM J427H

## (undated) DEVICE — STRIP,CLOSURE,WOUND,MEDI-STRIP,1/2X4: Brand: MEDLINE

## (undated) DEVICE — MASTISOL ADHESIVE LIQ 2/3ML

## (undated) DEVICE — SUTURE MCRYL SZ 4-0 L18IN ABSRB UD L19MM PS-2 3/8 CIR PRIM Y496G

## (undated) DEVICE — SPLINT FBRGLS W3XL35IN PRECUT ORTHOGLASS

## (undated) DEVICE — GOWN,PREVENTION PLUS,XL,ST,24/CS: Brand: MEDLINE

## (undated) DEVICE — SUTURE VCRL SZ 4-0 L18IN ABSRB UD L19MM PS-2 3/8 CIR PRIM J496H

## (undated) DEVICE — SOLUTION IRRIG 1000ML 09% SOD CHL USP PIC PLAS CONTAINER

## (undated) DEVICE — SUTURE VCRL SZ 3-0 L18IN ABSRB UD PS-2 L19MM 1/2 CIR J497G

## (undated) DEVICE — POINTED DRILL BIT

## (undated) DEVICE — PVC URETHRAL CATHETER: Brand: DOVER

## (undated) DEVICE — PRECISION THIN, OFFSET (5.5 X 0.38 X 25.0MM)

## (undated) DEVICE — PRECISION THIN (9.0 X 0.38 X 18.5MM)

## (undated) DEVICE — PRECISION THIN (7.0 X 0.38 X 18.5MM)